# Patient Record
Sex: MALE | Race: WHITE
[De-identification: names, ages, dates, MRNs, and addresses within clinical notes are randomized per-mention and may not be internally consistent; named-entity substitution may affect disease eponyms.]

---

## 2018-10-01 ENCOUNTER — HOSPITAL ENCOUNTER (EMERGENCY)
Dept: HOSPITAL 14 - H.ER | Age: 43
Discharge: HOME | End: 2018-10-01
Payer: MEDICAID

## 2018-10-01 VITALS
SYSTOLIC BLOOD PRESSURE: 126 MMHG | DIASTOLIC BLOOD PRESSURE: 74 MMHG | TEMPERATURE: 98.1 F | RESPIRATION RATE: 15 BRPM | HEART RATE: 79 BPM

## 2018-10-01 VITALS — BODY MASS INDEX: 25.8 KG/M2

## 2018-10-01 VITALS — OXYGEN SATURATION: 99 %

## 2018-10-01 DIAGNOSIS — K52.9: Primary | ICD-10-CM

## 2018-10-01 DIAGNOSIS — D64.9: ICD-10-CM

## 2018-10-01 LAB
ALBUMIN SERPL-MCNC: 3.7 G/DL (ref 3.5–5)
ALBUMIN/GLOB SERPL: 1.2 {RATIO} (ref 1–2.1)
ALT SERPL-CCNC: 25 U/L (ref 21–72)
AST SERPL-CCNC: 16 U/L (ref 17–59)
BASOPHILS # BLD AUTO: 0 K/UL (ref 0–0.2)
BASOPHILS NFR BLD: 0.6 % (ref 0–2)
BILIRUB UR-MCNC: NEGATIVE MG/DL
BUN SERPL-MCNC: 16 MG/DL (ref 9–20)
CALCIUM SERPL-MCNC: 8.8 MG/DL (ref 8.4–10.2)
COLOR UR: YELLOW
EOSINOPHIL # BLD AUTO: 0.3 K/UL (ref 0–0.7)
EOSINOPHIL NFR BLD: 4.2 % (ref 0–4)
ERYTHROCYTE [DISTWIDTH] IN BLOOD BY AUTOMATED COUNT: 17 % (ref 11.5–14.5)
GFR NON-AFRICAN AMERICAN: > 60
GLUCOSE UR STRIP-MCNC: (no result) MG/DL
HGB BLD-MCNC: 8.5 G/DL (ref 12–18)
LEUKOCYTE ESTERASE UR-ACNC: (no result) LEU/UL
LIPASE SERPL-CCNC: 83 U/L (ref 23–300)
LYMPHOCYTES # BLD AUTO: 1.6 K/UL (ref 1–4.3)
LYMPHOCYTES NFR BLD AUTO: 23.6 % (ref 20–40)
MCH RBC QN AUTO: 20.9 PG (ref 27–31)
MCHC RBC AUTO-ENTMCNC: 30.4 G/DL (ref 33–37)
MCV RBC AUTO: 68.9 FL (ref 80–94)
MONOCYTES # BLD: 0.6 K/UL (ref 0–0.8)
MONOCYTES NFR BLD: 8.2 % (ref 0–10)
NEUTROPHILS # BLD: 4.4 K/UL (ref 1.8–7)
NEUTROPHILS NFR BLD AUTO: 63.4 % (ref 50–75)
NRBC BLD AUTO-RTO: 0 % (ref 0–0)
PH UR STRIP: 6 [PH] (ref 5–8)
PLATELET # BLD: 258 K/UL (ref 130–400)
PMV BLD AUTO: 8 FL (ref 7.2–11.7)
PROT UR STRIP-MCNC: NEGATIVE MG/DL
RBC # BLD AUTO: 4.07 MIL/UL (ref 4.4–5.9)
RBC # UR STRIP: NEGATIVE /UL
SP GR UR STRIP: 1.02 (ref 1–1.03)
URINE CLARITY: CLEAR
UROBILINOGEN UR-MCNC: (no result) MG/DL (ref 0.2–1)
WBC # BLD AUTO: 6.9 K/UL (ref 4.8–10.8)

## 2018-10-01 PROCEDURE — 96374 THER/PROPH/DIAG INJ IV PUSH: CPT

## 2018-10-01 PROCEDURE — 81003 URINALYSIS AUTO W/O SCOPE: CPT

## 2018-10-01 PROCEDURE — 99283 EMERGENCY DEPT VISIT LOW MDM: CPT

## 2018-10-01 PROCEDURE — 85025 COMPLETE CBC W/AUTO DIFF WBC: CPT

## 2018-10-01 PROCEDURE — 83992 ASSAY FOR PHENCYCLIDINE: CPT

## 2018-10-01 PROCEDURE — 74022 RADEX COMPL AQT ABD SERIES: CPT

## 2018-10-01 PROCEDURE — 80349 CANNABINOIDS NATURAL: CPT

## 2018-10-01 PROCEDURE — 80346 BENZODIAZEPINES1-12: CPT

## 2018-10-01 PROCEDURE — 74177 CT ABD & PELVIS W/CONTRAST: CPT

## 2018-10-01 PROCEDURE — 80358 DRUG SCREENING METHADONE: CPT

## 2018-10-01 PROCEDURE — 80053 COMPREHEN METABOLIC PANEL: CPT

## 2018-10-01 PROCEDURE — 80345 DRUG SCREENING BARBITURATES: CPT

## 2018-10-01 PROCEDURE — 80324 DRUG SCREEN AMPHETAMINES 1/2: CPT

## 2018-10-01 PROCEDURE — 80353 DRUG SCREENING COCAINE: CPT

## 2018-10-01 PROCEDURE — 96375 TX/PRO/DX INJ NEW DRUG ADDON: CPT

## 2018-10-01 PROCEDURE — 80361 OPIATES 1 OR MORE: CPT

## 2018-10-01 PROCEDURE — 83690 ASSAY OF LIPASE: CPT

## 2018-10-01 NOTE — RAD
Date of service: 



10/01/2018



PROCEDURE:  Radiographs of the chest and abdomen (obstructive series)



HISTORY:

 abd pain 



COMPARISON:

No prior.



TECHNIQUE:

AP radiograph of the chest, with upright and supine radiographs of 

the abdomen.



FINDINGS:



CHEST:

Lungs: Clear.



Cardiovascular: Normal size heart. No pulmonary vascular congestion.



Pleura: No pleural fluid. No pneumothorax.



Other findings: None.



ABDOMEN AND PELVIS:

Bowel: Unremarkable bowel gas pattern. No evidence of mechanical 

obstruction.



Free air: None.



Bones:  Unremarkable.



Other findings: Small calcifications are identified overlying the 

plane of the mid and lower pole left renal silhouette.



IMPRESSION:

Questionable calculi at the left kidney as discussed above.  None are 

seen at the right.  No evidence of mechanical bowel obstruction.  

Nonacute chest radiograph.

## 2018-10-01 NOTE — CT
Date of service: 



10/01/2018



PROCEDURE:  CT Abdomen and Pelvis with contrast



HISTORY:

ABDOMINAL PAIN; ANEMIA



COMPARISON:

None.



TECHNIQUE:

Intravenous contrast dose: 95 cc Omnipaque 300



Radiation dose:



Total exam DLP = 528.82 mGy-cm.



This CT exam was performed using one or more of the following dose 

reduction techniques: Automated exposure control, adjustment of the 

mA and/or kV according to patient size, and/or use of iterative 

reconstruction technique.



FINDINGS:



LOWER THORAX:

Unremarkable. 



LIVER:

Simple cyst right hepatic lobe 2 cm.  Hepatic steatosis. No focal 

masses.  No intrahepatic bile duct dilatation or perihepatic ascites. 



GALLBLADDER AND BILE DUCTS:

Unremarkable. 



PANCREAS:

Unremarkable. No gross lesion or ductal dilatation.



SPLEEN:

Unremarkable. 



ADRENALS:

Unremarkable. No mass. 



KIDNEYS AND URETERS:

Solitary nonobstructing calculus 3 mm midpole left kidney..  No 

hydronephrosis. No solid mass. Incidental finding(s): Sub cm cyst 

midpole left kidney. 



VASCULATURE:

Unremarkable. No aortic aneurysm. 



BOWEL:

Mild inflammatory changes affecting the ascending colon.  

Unremarkable terminal ileum.



Mild dilatation, thickening of the wall of proximal small bowel 

without mechanical obstruction.  



Constipation without fecal impaction or obstruction. 



APPENDIX:

No abnormalities to suggest acute appendicitis. No right lower 

quadrant inflammatory processes identified. 



PERITONEUM:

Unremarkable. No free fluid. No free air. 



LYMPH NODES:

Unremarkable. No enlarged lymph nodes. 



BLADDER:

Unremarkable. 



REPRODUCTIVE:

Unremarkable. 



BONES:

No acute fracture. 



OTHER FINDINGS:

None.



IMPRESSION:

Findings consistent with mild enterocolitis.  Specifically, the wall 

of the cecum, adjacent ascending colon is thickened with pericolonic 

inflammatory changes.  Mild dilatation of proximal small bowel with 

thickening of bowel wall represents the mild enteritis. 



Additional benign and/or incidental findings described above.

## 2018-10-01 NOTE — ED PDOC
HPI: General Adult


Chief Complaint (Provider): abd pain


History Per: Patient (43 y/o male here with ongoing abdominal pain intermittent 

x 2 months associated with abdominal distention and intermittently improved with

simethicone.  States he was seen at [facility] and given rx for acid reducer and 

simethicone.  Was advised f/u for endoscopy but has not done so yet. Denies any 

fevers /chills.  Has h/o appendectomy.  Admits smoking 5-6 cig daily. Notes etoh

1/week. No drug activity.  Notes pain made worse with chinese food.)





<Jocelyne Ballard - Last Filed: 10/01/18 20:33>





<Brandy Bonner - Last Filed: 10/06/18 11:42>


Time Seen by Provider: 10/01/18 10:47


Chief Complaint (Nursing): Abdominal Pain





Past Medical History


Reviewed: Historical Data, Nursing Documentation, Vital Signs


Vital Signs: 





                                Last Vital Signs











Temp  99 F   10/01/18 09:57


 


Pulse  74   10/01/18 09:57


 


Resp  20   10/01/18 09:57


 


BP  123/70   10/01/18 09:57


 


Pulse Ox  99   10/01/18 09:57














- Family History


Family History: States: No Known Family Hx





- Social History


Current smoker - smoking cessation education provided: Yes (5-6 per day)


Alcohol: Occasional


Drugs: Denies





- Immunization History


Hx Tetanus Toxoid Vaccination: No


Hx Influenza Vaccination: No


Hx Pneumococcal Vaccination: No





<Jocelyne Ballard - Last Filed: 10/01/18 20:33>


Vital Signs: 





                                Last Vital Signs











Temp  98.1 F   10/01/18 21:20


 


Pulse  79   10/01/18 21:20


 


Resp  15   10/01/18 21:20


 


BP  126/74   10/01/18 21:20


 


Pulse Ox  99   10/01/18 21:20














<Brandy Bonner - Last Filed: 10/06/18 11:42>





- Home Medications


Home Medications: 


                                Ambulatory Orders











 Medication  Instructions  Recorded


 


Ciprofloxacin HCl [Cipro] 500 mg PO BID #14 tablet 10/01/18


 


Pantoprazole Sodium [Protonix] 40 mg PO DAILY #15 ect 10/01/18














- Allergies


Allergies/Adverse Reactions: 


                                    Allergies











Allergy/AdvReac Type Severity Reaction Status Date / Time


 


No Known Allergies Allergy   Verified 03/09/14 12:33














Review of Systems


ROS Statement: Except As Marked, All Systems Reviewed And Found Negative





<Jocelyne Ballard - Last Filed: 10/01/18 20:33>





Physical Exam





- Reviewed


Nursing Documentation Reviewed: Yes


Vital Signs Reviewed: Yes





- Physical Exam


Appears: Positive for: Well, Non-toxic, No Acute Distress


Head Exam: Positive for: ATRAUMATIC, NORMAL INSPECTION, NORMOCEPHALIC


Skin: Positive for: Normal Color, Warm, DRY


Eye Exam: Positive for: EOMI, Normal appearance, PERRL


ENT: Positive for: Normal ENT Inspection


Neck: Positive for: Normal, Painless ROM


Cardiovascular/Chest: Positive for: Regular Rate, Rhythm


Respiratory: Positive for: CNT, Normal Breath Sounds


Gastrointestinal/Abdominal: Positive for: Normal Exam, Soft, Tenderness (minimal

 llq tenderness)


Back: Positive for: Normal Inspection


Extremity: Positive for: Normal ROM


Neurologic/Psych: Positive for: Alert, Oriented





<Jocelyne Ballard - Last Filed: 10/01/18 20:33>





- Laboratory Results


Result Diagrams: 


                                 10/01/18 12:30





                                 10/01/18 12:30





- ECG


O2 Sat by Pulse Oximetry: 99





- Progress


ED Course And Treament: 





bentyl 20 mg x 1 dose


pepcid 20 mg iv x 1 dose








CT ABD/PELVIS:





None.





IMPRESSION:


Findings consistent with mild enterocolitis.  Specifically, the wall of the 

cecum, adjacent ascending colon is thickened with pericolonic inflammatory 

changes.  Mild dilatation of proximal small bowel with thickening of bowel wall 

represents the mild enteritis. 





Additional benign and/or incidental findings described above.





patient refused rectal exam here.








<BallardJocelyne crowell - Last Filed: 10/01/18 20:33>





- Laboratory Results


Result Diagrams: 


                                 10/01/18 12:30





                                 10/01/18 12:30





<Brandy Bonner - Last Filed: 10/06/18 11:42>





Disposition





- Patient ED Disposition


Is Patient to be Admitted: No





- Disposition


Disposition: Routine/Home


Disposition Time: 20:06





<Jocelyne Ballard - Last Filed: 10/01/18 20:33>





<Brandy Bonner - Last Filed: 10/06/18 11:42>





- Clinical Impression


Clinical Impression: 


 Enterocolitis, Anemia








- Disposition


Referrals: 


Sarath Rowell MD [Medical Doctor] - 


[facility] [place] [Outside]


Condition: IMPROVED


Additional Instructions: 


FOLLOW UP WITH PMD OR GI IN 2-3 DAYS AND REPEAT YOUR CBC 


Prescriptions: 


Ciprofloxacin HCl [Cipro] 500 mg PO BID #14 tablet


Pantoprazole Sodium [Protonix] 40 mg PO DAILY #15 ect


Instructions:  Low Fiber Diet, Acute Abdomen (Belly Pain), Adult (DC)


Forms:  [facility] (English), [facility] ED School/Work Excuse





Addendum


Addendum: 





10/06/18 11:42


Reviewed PA chart.  Agree with assessment and plan.





<Brandy Bonner - Last Filed: 10/06/18 11:42>

## 2018-10-11 ENCOUNTER — HOSPITAL ENCOUNTER (INPATIENT)
Dept: HOSPITAL 14 - H.ER | Age: 43
LOS: 11 days | Discharge: HOME | DRG: 148 | End: 2018-10-22
Attending: INTERNAL MEDICINE | Admitting: INTERNAL MEDICINE
Payer: MEDICAID

## 2018-10-11 VITALS — BODY MASS INDEX: 25.8 KG/M2

## 2018-10-11 DIAGNOSIS — Z53.31: ICD-10-CM

## 2018-10-11 DIAGNOSIS — C18.2: Primary | ICD-10-CM

## 2018-10-11 DIAGNOSIS — D50.0: ICD-10-CM

## 2018-10-11 DIAGNOSIS — G89.18: ICD-10-CM

## 2018-10-11 DIAGNOSIS — F10.10: ICD-10-CM

## 2018-10-11 DIAGNOSIS — F17.210: ICD-10-CM

## 2018-10-11 DIAGNOSIS — K52.9: ICD-10-CM

## 2018-10-11 DIAGNOSIS — K56.690: ICD-10-CM

## 2018-10-11 LAB
ALBUMIN SERPL-MCNC: 4 G/DL (ref 3.5–5)
ALBUMIN/GLOB SERPL: 1.2 {RATIO} (ref 1–2.1)
ALT SERPL-CCNC: 27 U/L (ref 21–72)
APTT BLD: 29.7 SECONDS (ref 25.6–37.1)
AST SERPL-CCNC: 24 U/L (ref 17–59)
BASOPHILS # BLD AUTO: 0 K/UL (ref 0–0.2)
BASOPHILS NFR BLD: 0.3 % (ref 0–2)
BUN SERPL-MCNC: 11 MG/DL (ref 9–20)
CALCIUM SERPL-MCNC: 9.1 MG/DL (ref 8.4–10.2)
EOSINOPHIL # BLD AUTO: 0.2 K/UL (ref 0–0.7)
EOSINOPHIL NFR BLD: 2 % (ref 0–4)
ERYTHROCYTE [DISTWIDTH] IN BLOOD BY AUTOMATED COUNT: 17.3 % (ref 11.5–14.5)
GFR NON-AFRICAN AMERICAN: > 60
HGB BLD-MCNC: 8.8 G/DL (ref 12–18)
INR PPP: 1.1
LYMPHOCYTES # BLD AUTO: 1 K/UL (ref 1–4.3)
LYMPHOCYTES NFR BLD AUTO: 11.8 % (ref 20–40)
MCH RBC QN AUTO: 21.1 PG (ref 27–31)
MCHC RBC AUTO-ENTMCNC: 31.1 G/DL (ref 33–37)
MCV RBC AUTO: 67.8 FL (ref 80–94)
MONOCYTES # BLD: 0.6 K/UL (ref 0–0.8)
MONOCYTES NFR BLD: 6.5 % (ref 0–10)
NEUTROPHILS # BLD: 6.9 K/UL (ref 1.8–7)
NEUTROPHILS NFR BLD AUTO: 79.4 % (ref 50–75)
NRBC BLD AUTO-RTO: 0.1 % (ref 0–0)
PLATELET # BLD: 303 K/UL (ref 130–400)
PMV BLD AUTO: 7.8 FL (ref 7.2–11.7)
PROTHROMBIN TIME: 11.9 SECONDS (ref 9.8–13.1)
RBC # BLD AUTO: 4.15 MIL/UL (ref 4.4–5.9)
WBC # BLD AUTO: 8.7 K/UL (ref 4.8–10.8)

## 2018-10-11 NOTE — ED PDOC
HPI: Abdomen


Time Seen by Provider: 10/11/18 18:03


Chief Complaint (Nursing): Abdominal Pain


Chief Complaint (Provider): Abdominal pain


History Per: Patient


History/Exam Limitations: no limitations


Onset/Duration Of Symptoms: Days, Persistent


Current Symptoms Are (Timing): Still Present


Location Of Pain/Discomfort: RUQ, RLQ


Quality Of Discomfort: "Pain"


Associated Symptoms: Loss Of Appetite


Additional History Per: Patient


Additional Complaint(s): 


41yo male, returns to ER with persistent and worsening abdominal pain, 

localizing to his right side. Patient reports associated loss of appetite and 

states his symptoms are unrelieved after taking cipro (prescribed last ER 

visit.) Prior records reviewed, and patient had hemoglobin of 8.4; patient 

states he was unaware of prior anemia and does admit to dyspnea on exertion. 

Patient also reports red colored stools; he reports he was due for a colonoscopy

but has not done it as he was unable to secure chairty care. Patient otherwise 

denies chest pain and offers no additional medical complaints.





PMD: None provided





Past Medical History


Reviewed: Historical Data, Nursing Documentation, Vital Signs


Vital Signs: 





                                Last Vital Signs











Temp  98.7 F   10/11/18 17:48


 


Pulse  68   10/11/18 17:48


 


Resp  16   10/11/18 17:48


 


BP  136/74   10/11/18 17:48


 


Pulse Ox  100   10/11/18 17:48














- Medical History


PMH: No Chronic Diseases





- Surgical History


Surgical History: Appendectomy





- Family History


Family History: States: No Known Family Hx





- Social History


Current smoker - smoking cessation education provided: Yes


Alcohol: Occasional


Drugs: Denies





- Immunization History


Hx Tetanus Toxoid Vaccination: No


Hx Influenza Vaccination: No


Hx Pneumococcal Vaccination: No





- Allergies


Allergies/Adverse Reactions: 


                                    Allergies











Allergy/AdvReac Type Severity Reaction Status Date / Time


 


No Known Allergies Allergy   Verified 10/11/18 17:48














Review of Systems


ROS Statement: Except As Marked, All Systems Reviewed And Found Negative


Constitutional: Negative for: Fever, Chills


Cardiovascular: Negative for: Chest Pain


Respiratory: Positive for: SOB with Exertion


Gastrointestinal: Positive for: Abdominal Pain, Other (loss of appetite; 

abnormal stools)





Physical Exam





- Reviewed


Nursing Documentation Reviewed: Yes


Vital Signs Reviewed: Yes





- Physical Exam


Appears: Positive for: Non-toxic


Head Exam: Positive for: ATRAUMATIC, NORMAL INSPECTION, NORMOCEPHALIC


Skin: Positive for: Pallor


Eye Exam: Positive for: Normal appearance


Neck: Positive for: Normal, Supple


Cardiovascular/Chest: Positive for: Regular Rate, Rhythm


Respiratory: Positive for: Normal Breath Sounds


Gastrointestinal/Abdominal: Positive for: Soft, Tenderness (tenderness to 

palpation right upper and lower quadrants).  Negative for: Guarding, Rebound


Back: Positive for: Normal Inspection.  Negative for: L CVA Tenderness, R CVA 

Tenderness, Vertebral Tenderness


Extremity: Positive for: Normal ROM.  Negative for: Pedal Edema


Neurologic/Psych: Positive for: Alert, Oriented.  Negative for: Motor/Sensory 

Deficits





- Laboratory Results


Result Diagrams: 


                                 10/16/18 05:40





                                 10/16/18 13:40





- ECG


O2 Sat by Pulse Oximetry: 100 (RA)


Pulse Ox Interpretation: Normal





Medical Decision Making


Medical Decision Making: 


Prior charts reviewed; labs to be repeated to r/o worsening anemia.


Re-image abdomen, given tenderness on exam.





Hgb noted to be 8.8





20:00


Patient signed out to Dr. An pending CT study, reassessment.





Scribe Attestation:


Documented by Shi Clay, acting as a scribe for Berny Eason DO.





Provider Scribe Attestation:


All medical record entries made by the Scribe were at my direction and 

personally dictated by me. I have reviewed the chart and agree that the record 

accurately reflects my personal performance of the history, physical exam, 

medical decision making, and the department course for this patient. I have also

personally directed, reviewed, and agree with the discharge instructions and 

disposition.








Disposition





- Clinical Impression


Clinical Impression: 


 Colitis








- Patient ED Disposition


Is Patient to be Admitted: Transfer of Care


Counseled Patient/Family Regarding: Studies Performed, Diagnosis





- Disposition


Disposition: Transfer of Care


Disposition Time: 19:54


Condition: STABLE


Patient Signed Over To: Kahlil An

## 2018-10-11 NOTE — ED PDOC
- Laboratory Results


Result Diagrams: 


                                 10/11/18 18:30





                                 10/11/18 18:30





- ECG


O2 Sat by Pulse Oximetry: 100 (RA)


Pulse Ox Interpretation: Normal





Medical Decision Making


Medical Decision Makin


Patient signed out to me by Dr. Patel pending CT Abdomen, reassessment.








Patient reports persistent pain, Morphine 4mg IV given.





22:28


Abdomen/Pelvis CT


Findings: There is dilatation and pericolonic inflammatory changes in the 

descending colon and proximal transverse colon.  There is a submucosal fat 

stripe appreciated of the descending colon.there is an 8 mm cystic-like 

structure at the periphery of the liver.  Further evaluated by ultrasound.  The 

remainder of the solid abdominal organs are unremarkable.  The gallbladder is 

benign.there is prominence of the gastric folds.  There is no retroperitoneal 

adenopathy.  No free pelvic fluid. 


The heart is not enlarged.  There are no infiltrates or pulmonary base. 





Impression: Findings consistent with a colitis.  Correlate clinically.





23:20


-Discussed case with Dr. Jose, medicine on call. Labs showed no clinical 

significant abnormalities. Patient continues to have abdominal pain given recent

Cipro and CT that showed mild enterocolitis 10 days ago, will admit for failure 

of outpatient treatment diagnosis. Diagnosis of enterocolitis.





Scribe Attestation:


Documented by Shi Clay, acting as a scribe for Kahlil An MD.





Provider Scribe Attestation:


All medical record entries made by the Scribe were at my direction and 

personally dictated by me. I have reviewed the chart and agree that the record 

accurately reflects my personal performance of the history, physical exam, 

medical decision making, and the department course for this patient. I have also

personally directed, reviewed, and agree with the discharge instructions and 

disposition.








Disposition


Discussed With : Brady Jose





- Clinical Impression


Clinical Impression: 


 Colitis








- POA


Present On Arrival: None





- Disposition


Disposition: Admitted as In-Patient


Disposition Time: 23:20


Condition: STABLE

## 2018-10-12 LAB
ALBUMIN SERPL-MCNC: 3.7 G/DL (ref 3.5–5)
ALBUMIN/GLOB SERPL: 1.1 {RATIO} (ref 1–2.1)
ALT SERPL-CCNC: 26 U/L (ref 21–72)
AST SERPL-CCNC: 22 U/L (ref 17–59)
BASOPHILS # BLD AUTO: 0 K/UL (ref 0–0.2)
BASOPHILS NFR BLD: 0.3 % (ref 0–2)
BILIRUB UR-MCNC: NEGATIVE MG/DL
BUN SERPL-MCNC: 9 MG/DL (ref 9–20)
CALCIUM SERPL-MCNC: 8.8 MG/DL (ref 8.4–10.2)
COLOR UR: COLORLESS
EOSINOPHIL # BLD AUTO: 0.2 K/UL (ref 0–0.7)
EOSINOPHIL NFR BLD: 3.1 % (ref 0–4)
ERYTHROCYTE [DISTWIDTH] IN BLOOD BY AUTOMATED COUNT: 17.1 % (ref 11.5–14.5)
GFR NON-AFRICAN AMERICAN: > 60
GLUCOSE UR STRIP-MCNC: (no result) MG/DL
HDLC SERPL-MCNC: 42 MG/DL (ref 30–70)
HGB BLD-MCNC: 9.1 G/DL (ref 12–18)
LDLC SERPL-MCNC: 81 MG/DL (ref 0–129)
LEUKOCYTE ESTERASE UR-ACNC: (no result) LEU/UL
LYMPHOCYTES # BLD AUTO: 0.9 K/UL (ref 1–4.3)
LYMPHOCYTES NFR BLD AUTO: 10.8 % (ref 20–40)
MCH RBC QN AUTO: 21.3 PG (ref 27–31)
MCHC RBC AUTO-ENTMCNC: 31.5 G/DL (ref 33–37)
MCV RBC AUTO: 67.4 FL (ref 80–94)
MONOCYTES # BLD: 0.6 K/UL (ref 0–0.8)
MONOCYTES NFR BLD: 8 % (ref 0–10)
NEUTROPHILS # BLD: 6.1 K/UL (ref 1.8–7)
NEUTROPHILS NFR BLD AUTO: 77.8 % (ref 50–75)
NRBC BLD AUTO-RTO: 0.1 % (ref 0–0)
PH UR STRIP: 6 [PH] (ref 5–8)
PLATELET # BLD: 270 K/UL (ref 130–400)
PMV BLD AUTO: 7.7 FL (ref 7.2–11.7)
PROT UR STRIP-MCNC: NEGATIVE MG/DL
RBC # BLD AUTO: 4.28 MIL/UL (ref 4.4–5.9)
RBC # UR STRIP: NEGATIVE /UL
SP GR UR STRIP: 1 (ref 1–1.03)
T4 SERPL-MCNC: 8.02 UG/DL (ref 5.5–11)
URINE CLARITY: CLEAR
UROBILINOGEN UR-MCNC: (no result) MG/DL (ref 0.2–1)
WBC # BLD AUTO: 7.9 K/UL (ref 4.8–10.8)

## 2018-10-12 PROCEDURE — 0DDK8ZX EXTRACTION OF ASCENDING COLON, VIA NATURAL OR ARTIFICIAL OPENING ENDOSCOPIC, DIAGNOSTIC: ICD-10-PCS | Performed by: INTERNAL MEDICINE

## 2018-10-12 PROCEDURE — 0DDN8ZX EXTRACTION OF SIGMOID COLON, VIA NATURAL OR ARTIFICIAL OPENING ENDOSCOPIC, DIAGNOSTIC: ICD-10-PCS | Performed by: INTERNAL MEDICINE

## 2018-10-12 RX ADMIN — DEXTROSE AND SODIUM CHLORIDE SCH MLS/HR: 5; 450 INJECTION, SOLUTION INTRAVENOUS at 17:55

## 2018-10-12 NOTE — CP.PCM.CON
History of Present Illness





- History of Present Illness


History of Present Illness: 





GI consult note for Dr. Ara Balderas, PGY-2





Pt S & E at bedside at 0005





42F w/no sig PMH consulted for colitis.  Pt reports that he was seen on 10/1 for

abdominal pain, was discharged on Cipro/flagyl, however did not have money to 

pay for flagyl, so only took Cipro, decided to cut Cipro in 1/2 for last few 

doses due to lack of terrence resources.  Pt reports recurrence of suprapubic 

abdominal pain 2 days prior to evaluation.  Pain radiated diffusely, constant, 

moderate-severe.  Pain re-located to LLQ over time, increased with deep 

inspiration.  Admits to change in BM caliber over last few weeks- now small 

"marble" like, has to strain when defecating, decreased BM frequency, reports 

color now more maroon.  Reports bright red blood per rectum a few weeks ago with

blood noted on toilet paper.  Denies melena, diarrhea, F & C, changes in urinary

habits, changes in eating habits, CP, SOB, headache, recent travel, other people

in household with similar findings. 








In ED- Afebrile, no leukocytosis.  Hgb 8.8 - was 8.5 on 10/1. Albumin 4.0.  CT 

ab w/findings consistent with colitis - 


dilatation and pericolonic inflammatory changes in the descending colon and 

proximal transverse colon.  There is a submucosal fat stripe appreciated of the 

descending colon.there is an 8 mm cystic-like structure at the periphery of the 

liver.





PMH: Denies


PSH: open appendectomy


All: NKDA


SH: Admits to ETOH use- 1/2 pint [place] w/beer every 2 weeks, admits to 

tobacco use- #5-6 daily x few months, admits to recent cocaine use due to 

divorce, job stress etc


FH: Denies hx of colon cancer or diseases in family


PMD: Denies





Review of Systems





- Review of Systems


All systems: reviewed and no additional remarkable complaints except





- Constitutional


Constitutional: absent: Chills, Fever, Headache





- EENT


Eyes: Spots in Vision.  absent: Change in Vision


Nose/Mouth/Throat: absent: Sore Throat





- Cardiovascular


Cardiovascular: absent: Chest Pain





- Respiratory


Respiratory: Pain on Inspiration





- Gastrointestinal


Gastrointestinal: Abdominal Pain, Change in Bowel Habits, Change in Stool 

Character, Constipation.  absent: Diarrhea, Hematochezia, Melena, Nausea, 

Temesmus, Vomiting





- Genitourinary


Genitourinary: absent: Dysuria, Hematuria





- Musculoskeletal


Musculoskeletal: absent: Back Pain





- Integumentary


Integumentary: absent: Rash





- Neurological


Neurological: absent: Weakness





- Psychiatric


Psychiatric: absent: Change in Appetite





Past Patient History





- Past Social History


Alcohol: Occasional


Drugs: Denies





- PSYCHIATRIC


Hx Substance Use: Yes (ectasy)





- SURGICAL HISTORY


Hx Appendectomy: Yes





Meds


Allergies/Adverse Reactions: 


                                    Allergies











Allergy/AdvReac Type Severity Reaction Status Date / Time


 


No Known Allergies Allergy   Verified 10/11/18 17:48














Physical Exam





- Constitutional


Appears: Non-toxic, No Acute Distress





- Head Exam


Head Exam: ATRAUMATIC, NORMAL INSPECTION, NORMOCEPHALIC





- Eye Exam


Eye Exam: EOMI, Normal appearance





- ENT Exam


ENT Exam: Mucous Membranes Moist, Normal Exam





- Neck Exam


Neck exam: Positive for: Full Rom, Normal Inspection





- Respiratory Exam


Respiratory Exam: Clear to Auscultation Bilateral, NORMAL BREATHING PATTERN.  

absent: Prolonged Expiratory Phase, Rales, Rhonchi, Wheezes, Respiratory 

Distress





- Cardiovascular Exam


Cardiovascular Exam: REGULAR RHYTHM, +S1, +S2





- GI/Abdominal Exam


GI & Abdominal Exam: Guarding (LLQ), Normal Bowel Sounds, Soft, Tenderness 

(LLQ).  absent: Distended, Firm, Hernia, Rebound, Rigid





- Rectal Exam


Rectal Exam: Deferred





- Extremities Exam


Extremities exam: Positive for: normal inspection





- Back Exam


Back exam: NORMAL INSPECTION.  absent: CVA tenderness (L), CVA tenderness (R)





- Neurological Exam


Neurological exam: Alert, CN II-XII Intact, Oriented x3





- Psychiatric Exam


Psychiatric exam: Normal Affect, Normal Mood





- Skin


Skin Exam: Dry, Intact, Normal Color, Warm





Results





- Vital Signs


Recent Vital Signs: 


                                Last Vital Signs











Temp  98.7 F   10/11/18 17:48


 


Pulse  68   10/11/18 17:48


 


Resp  16   10/11/18 17:48


 


BP  136/74   10/11/18 17:48


 


Pulse Ox  100   10/11/18 23:30














- Labs


Result Diagrams: 


                                 10/11/18 18:30





                                 10/11/18 18:30


Labs: 


                         Laboratory Results - last 24 hr











  10/11/18 10/11/18 10/11/18





  18:30 18:30 18:30


 


WBC   8.7 


 


RBC   4.15 L 


 


Hgb   8.8 L 


 


Hct   28.1 L 


 


MCV   67.8 L 


 


MCH   21.1 L 


 


MCHC   31.1 L 


 


RDW   17.3 H 


 


Plt Count   303 


 


MPV   7.8 


 


Neut % (Auto)   79.4 H 


 


Lymph % (Auto)   11.8 L 


 


Mono % (Auto)   6.5 


 


Eos % (Auto)   2.0 


 


Baso % (Auto)   0.3 


 


Neut # (Auto)   6.9 


 


Lymph # (Auto)   1.0 


 


Mono # (Auto)   0.6 


 


Eos # (Auto)   0.2 


 


Baso # (Auto)   0.0 


 


PT    11.9


 


INR    1.1


 


APTT    29.7


 


Sodium  140  


 


Potassium  3.8  


 


Chloride  109 H  


 


Carbon Dioxide  23  


 


Anion Gap  12  


 


BUN  11  


 


Creatinine  0.8  


 


Est GFR ( Amer)  > 60  


 


Est GFR (Non-Af Amer)  > 60  


 


Random Glucose  108  


 


Calcium  9.1  


 


Magnesium  2.0  


 


Total Bilirubin  0.1 L  


 


AST  24  


 


ALT  27  


 


Alkaline Phosphatase  95  


 


Total Protein  7.2  


 


Albumin  4.0  


 


Globulin  3.2  


 


Albumin/Globulin Ratio  1.2  














Assessment & Plan





- Assessment and Plan (Free Text)


Assessment: 





42M w/colitis - failed outpatient treatment


Plan: 





FU AM labs


NPO


IVF


Abx 


Enemas until BM clear


Plan for colonoscopy today





DW Dr. Mateusz Balderas, PGY-2








- Date & Time


Date: 10/12/18


Time: 00:31

## 2018-10-12 NOTE — CT
Date of service: 



10/12/2018



PROCEDURE:  CT Chest without contrast



HISTORY:

Admission, colon mass



COMPARISON:

None available.



TECHNIQUE:

Contiguous axial images were obtained through the chest without 

intravenous contrast enhancement. Sagittal and coronal 

reconstructions were performed.







Radiation dose (DLP): 396.25 mGy-cm. 



This CT exam was performed using one or more of the following dose 

reduction techniques: Automated exposure control, adjustment of the 

mA and/or kV according to patient size, and/or use of iterative 

reconstruction technique.



FINDINGS:



LUNGS:

 Clear lungs. Visualized airway clear



MEDIASTINUM:

Unremarkable thoracic aorta. No aneurysm. Normal sized heart. Main 

pulmonary artery unremarkable. No vascular congestion. No 

lymphadenopathy.



PLEURA:

No pleural fluid. No pneumothorax.



BONES:

No fracture. No destructive lesion. 



UPPER ABDOMEN:

Grossly unremarkable.



OTHER FINDINGS:

None.



IMPRESSION:

No evidence of thoracic metastasis.  Unremarkable examination.

## 2018-10-12 NOTE — CT
Date of service: 



10/11/2018



PROCEDURE:  CT Abdomen and Pelvis with contrast



HISTORY:

persistent abd pain, pallor, repeat CT



COMPARISON:

10/01/2018 CT abdomen and pelvis.



TECHNIQUE:

Intravenous contrast dose: 100 cc Omnipaque 300



Radiation dose:



Total exam DLP = 531.72 mGy-cm.



This CT exam was performed using one or more of the following dose 

reduction techniques: Automated exposure control, adjustment of the 

mA and/or kV according to patient size, and/or use of iterative 

reconstruction technique.



FINDINGS:



LOWER THORAX:

Unremarkable. 



LIVER:

Unremarkable. No gross lesion or ductal dilatation. Simple cyst right 

hepatic lobe 



GALLBLADDER AND BILE DUCTS:

Unremarkable. 



PANCREAS:

Unremarkable. No gross lesion or ductal dilatation.



SPLEEN:

Unremarkable. 



ADRENALS:

Unremarkable. No mass. 



KIDNEYS AND URETERS:

Unremarkable. No hydronephrosis. No solid mass. 



VASCULATURE:

Unremarkable. No aortic aneurysm. 



BOWEL:

Inflammatory changes affecting the ascending colon have improved 

since the prior study.



This has identified an area of asymmetric circumferential narrowing 

in the hepatic flexure. Proximal to this the ascending colon is 

distended .  The findings are suspicious for neoplasm of the colon.



The remainder of the colon is nondistended.



APPENDIX:

Normal appendix. 



PERITONEUM:

Unremarkable. No free fluid. No free air. 



LYMPH NODES:

Unremarkable. No enlarged lymph nodes. 



BLADDER:

Unremarkable. 



REPRODUCTIVE:

Unremarkable. 



BONES:

No acute fracture. 



OTHER FINDINGS:

None.



IMPRESSION:

Suspicious mass in the ascending colon. The prior study documented 

inflammatory changes. Which in the interval have improved identifying 

a circumferential mass suspicious for neoplasm. Proximal distention 

of the colon identified. 



Communication of results: The study was completed at 21:40. 



Preliminary report provided at 22:28. 



Final interpretation 11:31. 



Communication of these findings to nurse involved in the care and 

management this patient (June) at 10:24.

## 2018-10-12 NOTE — CP.PCM.HP
History of Present Illness





- History of Present Illness


History of Present Illness: 





CC: Abdominal pain.





41 y/o M, Hx of substance abuse, recently Dx with Colitis, came in to ER [facility], 

[place] to be evaluated for generalized abdominal pain, onset about 3 months ago

with on and off pain,  gradually increased from 2 days PTA with no relief.  Pt 

appeared in the the ED c/o of abdominal pain more prominent to lower quadrants, 

described as sharp, severe intensity 8:10,  associated to nausea, BM with dark 

stool and bright red blood color.





As per Pt; He was seen in ER [facility] on 10/01/18,  Dx with Colitis on CT, was 

discharged on Cipro and Flagyl, but claims he didn't have enough money to by 

both medications, so he bought only the Cipro, which he used 1/2 dose to prolong

the abx.





Worsening symptoms:  Found with anemia (Hgb 8.8),  also CT Abd/Pelv, showing 

suspicious mass in the ascending colon.  Lack of appetite, loosing about 20 lbs 

in the past 3 months.





Aggravated factor:  Failed outpatient treatment.





Pt denied:  Fever, chills, vomiting, diarrhea, urinary symptoms, CP, 

palpitations, syncope, SOB, cough, sick contact, recent travel out of [place].





As per clinical finding and CT, Pt was seen by GI and underwent Colonoscopy with

Bx. in AM today, showing: Partially obstructive mass in the ascending colon from

anus with some bleeding.











Present on Admission





- Present on Admission


Any Indicators Present on Admission: No





Review of Systems





- Constitutional


Constitutional: Weight Loss, Other (decreased appetite.)





- EENT


Eyes: Other


Ears: Other (negative)


Nose/Mouth/Throat: Other (negative)





- Cardiovascular


Cardiovascular: Other (negative)





- Respiratory


Respiratory: Other (negative)





- Gastrointestinal


Gastrointestinal: Abdominal Pain, Change in Stool Character, Hematochezia, 

Nausea





- Genitourinary


Genitourinary: Other (negative)





- Musculoskeletal


Musculoskeletal: Other (negative)





- Integumentary


Integumentary: Other (negative)





- Neurological


Neurological: Other (negative)





- Psychiatric


Psychiatric: Other (negative)





- Endocrine


Endocrine: Other (negative)





- Hematologic/Lymphatic


Hematologic: Other (anemia)





Past Patient History





- Past Medical History & Family History


Pertinent Family History: 





No family Hx of Ca.





- Past Social History


Smoking Status: Light Smoker < 10 Cigarettes Daily


Alcohol: Occasional


Drugs: Denies


Home Situation {Lives}: Alone





- CARDIAC


Hx Cardiac Disorders: No





- PULMONARY


Hx Respiratory Disorders: No





- NEUROLOGICAL


Hx Neurological Disorder: No





- HEENT


Hx HEENT Problems: No





- RENAL


Hx Chronic Kidney Disease: No





- ENDOCRINE/METABOLIC


Hx Endocrine Disorders: No





- HEMATOLOGICAL/ONCOLOGICAL


Hx Blood Disorders: No





- INTEGUMENTARY


Hx Dermatological Problems: No





- MUSCULOSKELETAL/RHEUMATOLOGICAL


Hx Musculoskeletal Disorders: No


Hx Falls: No





- GASTROINTESTINAL


Hx Gastrointestinal Disorders: Yes


Hx Colitis: Yes





- GENITOURINARY/GYNECOLOGICAL


Hx Genitourinary Disorders: No





- PSYCHIATRIC


Hx Psychophysiologic Disorder: Yes


Hx Depression: Yes


Hx Substance Use: Yes (ectasy)





- SURGICAL HISTORY


Hx Surgeries: Yes


Hx Appendectomy: Yes





- ANESTHESIA


Hx Anesthesia: Yes


Hx Anesthesia Reactions: No


Hx Malignant Hyperthermia: No





Meds


Allergies/Adverse Reactions: 


                                    Allergies











Allergy/AdvReac Type Severity Reaction Status Date / Time


 


No Known Allergies Allergy   Verified 10/11/18 17:48














Physical Exam





- Constitutional


Appears: No Acute Distress





- Head Exam


Head Exam: NORMAL INSPECTION





- Eye Exam


Eye Exam: PERRL





- ENT Exam


ENT Exam: Normal Exam





- Neck Exam


Neck exam: Positive for: Normal Inspection





- Respiratory Exam


Respiratory Exam: NORMAL BREATHING PATTERN





- Cardiovascular Exam


Cardiovascular Exam: REGULAR RHYTHM





- GI/Abdominal Exam


GI & Abdominal Exam: Soft, Tenderness (Rupper and lower quadrant)





- Extremities Exam


Extremities exam: Positive for: normal inspection





- Back Exam


Back exam: NORMAL INSPECTION





- Neurological Exam


Neurological exam: Alert, Oriented x3





- Psychiatric Exam


Psychiatric exam: Normal Mood





- Skin


Skin Exam: Warm





Results





- Vital Signs


Recent Vital Signs: 





                                Last Vital Signs











Temp  98 F   10/12/18 16:18


 


Pulse  62   10/12/18 16:18


 


Resp  18   10/12/18 16:18


 


BP  114/70   10/12/18 16:18


 


Pulse Ox  100   10/12/18 16:18








reviewed J.PDesiree





- Labs


Result Diagrams: 


                                 10/12/18 11:33





                                 10/12/18 11:33


Labs: 





                         Laboratory Results - last 24 hr











  10/11/18 10/11/18 10/11/18





  18:30 18:30 18:30


 


WBC   8.7 


 


RBC   4.15 L 


 


Hgb   8.8 L 


 


Hct   28.1 L 


 


MCV   67.8 L 


 


MCH   21.1 L 


 


MCHC   31.1 L 


 


RDW   17.3 H 


 


Plt Count   303 


 


MPV   7.8 


 


Neut % (Auto)   79.4 H 


 


Lymph % (Auto)   11.8 L 


 


Mono % (Auto)   6.5 


 


Eos % (Auto)   2.0 


 


Baso % (Auto)   0.3 


 


Neut # (Auto)   6.9 


 


Lymph # (Auto)   1.0 


 


Mono # (Auto)   0.6 


 


Eos # (Auto)   0.2 


 


Baso # (Auto)   0.0 


 


PT    11.9


 


INR    1.1


 


APTT    29.7


 


Sodium  140  


 


Potassium  3.8  


 


Chloride  109 H  


 


Carbon Dioxide  23  


 


Anion Gap  12  


 


BUN  11  


 


Creatinine  0.8  


 


Est GFR ( Amer)  > 60  


 


Est GFR (Non-Af Amer)  > 60  


 


Random Glucose  108  


 


Lactic Acid   


 


Calcium  9.1  


 


Magnesium  2.0  


 


Total Bilirubin  0.1 L  


 


AST  24  


 


ALT  27  


 


Alkaline Phosphatase  95  


 


Total Protein  7.2  


 


Albumin  4.0  


 


Globulin  3.2  


 


Albumin/Globulin Ratio  1.2  


 


Triglycerides   


 


Cholesterol   


 


LDL Cholesterol Direct   


 


HDL Cholesterol   


 


Carcinoembryonic Ag   


 


Thyroxine (T4)   


 


TSH 3rd Generation   


 


Urine Color   


 


Urine Clarity   


 


Urine pH   


 


Ur Specific Gravity   


 


Urine Protein   


 


Urine Glucose (UA)   


 


Urine Ketones   


 


Urine Blood   


 


Urine Nitrate   


 


Urine Bilirubin   


 


Urine Urobilinogen   


 


Ur Leukocyte Esterase   


 


Urine RBC (Auto)   


 


HIV-1 Ab Rapid Screen   














  10/12/18 10/12/18 10/12/18





  05:15 05:27 11:33


 


WBC    7.9


 


RBC    4.28 L


 


Hgb    9.1 L


 


Hct    28.8 L


 


MCV    67.4 L


 


MCH    21.3 L


 


MCHC    31.5 L


 


RDW    17.1 H


 


Plt Count    270


 


MPV    7.7


 


Neut % (Auto)    77.8 H


 


Lymph % (Auto)    10.8 L


 


Mono % (Auto)    8.0


 


Eos % (Auto)    3.1


 


Baso % (Auto)    0.3


 


Neut # (Auto)    6.1


 


Lymph # (Auto)    0.9 L


 


Mono # (Auto)    0.6


 


Eos # (Auto)    0.2


 


Baso # (Auto)    0.0


 


PT   


 


INR   


 


APTT   


 


Sodium   


 


Potassium   


 


Chloride   


 


Carbon Dioxide   


 


Anion Gap   


 


BUN   


 


Creatinine   


 


Est GFR ( Amer)   


 


Est GFR (Non-Af Amer)   


 


Random Glucose   


 


Lactic Acid  1.2  


 


Calcium   


 


Magnesium   


 


Total Bilirubin   


 


AST   


 


ALT   


 


Alkaline Phosphatase   


 


Total Protein   


 


Albumin   


 


Globulin   


 


Albumin/Globulin Ratio   


 


Triglycerides   


 


Cholesterol   


 


LDL Cholesterol Direct   


 


HDL Cholesterol   


 


Carcinoembryonic Ag   


 


Thyroxine (T4)   


 


TSH 3rd Generation   


 


Urine Color   Colorless 


 


Urine Clarity   Clear 


 


Urine pH   6.0 


 


Ur Specific Gravity   1.005 


 


Urine Protein   Negative 


 


Urine Glucose (UA)   Neg 


 


Urine Ketones   Negative 


 


Urine Blood   Negative 


 


Urine Nitrate   Negative 


 


Urine Bilirubin   Negative 


 


Urine Urobilinogen   0.2-1.0 


 


Ur Leukocyte Esterase   Neg 


 


Urine RBC (Auto)   1 


 


HIV-1 Ab Rapid Screen   














  10/12/18 10/12/18 10/12/18





  11:33 11:33 11:33


 


WBC   


 


RBC   


 


Hgb   


 


Hct   


 


MCV   


 


MCH   


 


MCHC   


 


RDW   


 


Plt Count   


 


MPV   


 


Neut % (Auto)   


 


Lymph % (Auto)   


 


Mono % (Auto)   


 


Eos % (Auto)   


 


Baso % (Auto)   


 


Neut # (Auto)   


 


Lymph # (Auto)   


 


Mono # (Auto)   


 


Eos # (Auto)   


 


Baso # (Auto)   


 


PT   


 


INR   


 


APTT   


 


Sodium  138  


 


Potassium  4.5  


 


Chloride  104  


 


Carbon Dioxide  28  


 


Anion Gap  11  


 


BUN  9  


 


Creatinine  0.8  


 


Est GFR ( Amer)  > 60  


 


Est GFR (Non-Af Amer)  > 60  


 


Random Glucose  111 H  


 


Lactic Acid   


 


Calcium  8.8  


 


Magnesium   


 


Total Bilirubin  0.2  


 


AST  22  


 


ALT  26  


 


Alkaline Phosphatase  84  


 


Total Protein  7.0  


 


Albumin  3.7  


 


Globulin  3.3  


 


Albumin/Globulin Ratio  1.1  


 


Triglycerides  156 H  


 


Cholesterol  154  


 


LDL Cholesterol Direct  81  


 


HDL Cholesterol  42  


 


Carcinoembryonic Ag    10.4 H


 


Thyroxine (T4)  8.02  


 


TSH 3rd Generation  2.54  


 


Urine Color   


 


Urine Clarity   


 


Urine pH   


 


Ur Specific Gravity   


 


Urine Protein   


 


Urine Glucose (UA)   


 


Urine Ketones   


 


Urine Blood   


 


Urine Nitrate   


 


Urine Bilirubin   


 


Urine Urobilinogen   


 


Ur Leukocyte Esterase   


 


Urine RBC (Auto)   


 


HIV-1 Ab Rapid Screen   Non reactive 








reviewed J.P.





- Imaging and Cardiology


  ** CT scan - abdomen


Status: Report reviewed by me (J.P.)





  ** CT scan - pelvis


Status: Report reviewed by me (J.P.)





  ** CT scan - chest


Status: Report reviewed by me (J.P.)





  ** US - abdomen


Status: Report reviewed by me (J.P.)





Assessment & Plan


(1) Mass of colon


Status: Acute   Priority: High   





(2) Abdominal pain


Status: Acute   Priority: High   





- Assessment and Plan (Free Text)


Plan: 





F/U Pathology report, continue liquid diet, Morphine.  GI, Hematology and 

Surgery consultants appreciated.





- Date & Time


Date: 10/12/18


Time: 14:00

## 2018-10-12 NOTE — RAD
Date of service: 



10/12/2018



HISTORY:

 Localization of clip/ [place] view. 



COMPARISON:

No prior.



FINDINGS:



BOWEL:

There is a small amount of oral contrast seen in the cecum and 

terminal ileum.  Bowel gas pattern is unremarkable.  There is no 

hepatic or splenic enlargement appreciated.  There are no masses or 

abnormal intra-abdominal calcifications.  There is a metallic 

radiopaque foreign body projecting over the right upper quadrant of 

the abdomen.  



BONES:

Normal.



OTHER FINDINGS:

None.



IMPRESSION:

No active disease.

## 2018-10-12 NOTE — CP.PCM.CON
<Katherine eZe - Last Filed: 10/12/18 11:29>





History of Present Illness





- History of Present Illness


History of Present Illness: 





Surgery Consult: Dr. Alatorre





Pt is a 42M with no significant PMHx who presented to [facility] with abdominal pain. 

Pt states he has had on & off abdominal pain for the past 3 months that he 

initially ignored & thought would go away. However, 2 days ago pt started having

intense lower quadrant pain with some associated nausea so he came to the ER. Pt

also reports coming to the ER two weeks ago, at which point he was told he had 

colitis and was sent home with PO ABX. Pt reports intermittent episodes of pain 

since then, however reports he was tolerating a regular diet and having regular 

BMs. He does admit to some dark stools and bright red blood per rectum a few 

times.


On this admission, pt had a CT abdomen/pelvis in the ER which showed some 

inflammatory changes and possible mass at the hepatic flexure. GI and surgery 

consulted to evaluate. Pt underwent a colonoscopy with GI this morning which 

showed partially obstructing mass in the ascending colon 90cm from the anus with

some bleeding.


Currently, pt is resting comfortably in bed. States his abdominal pain is well 

controlled at this time. He states his last BM was this morning and it was loose

due to the enema. Denies any BPR, denies nausea/vomiting, fevers/chills, chest 

pain or SOB. Pt does admit to about a 20lb weight loss in the last 3 months 

without trying. 





PMHx: denies


PSHx: appendectomy, sinus sx


SocialHx: 5-6 cigs/day x 1 yr, 1 bottle of Shania/week x 1 yr, occasional 

cocaine use


FamilyHx: denies hx of cancer in family 


NKDA  





Review of Systems





- Review of Systems


All systems: reviewed and no additional remarkable complaints except (as per 

HPI)





Past Patient History





- Past Social History


Alcohol: Occasional


Drugs: Denies





- CARDIAC


Hx Cardiac Disorders: No





- PULMONARY


Hx Respiratory Disorders: No





- NEUROLOGICAL


Hx Neurological Disorder: No





- HEENT


Hx HEENT Problems: No





- RENAL


Hx Chronic Kidney Disease: No





- ENDOCRINE/METABOLIC


Hx Endocrine Disorders: No





- HEMATOLOGICAL/ONCOLOGICAL


Hx Blood Disorders: No





- INTEGUMENTARY


Hx Dermatological Problems: No





- MUSCULOSKELETAL/RHEUMATOLOGICAL


Hx Musculoskeletal Disorders: No


Hx Falls: No





- GASTROINTESTINAL


Hx Gastrointestinal Disorders: Yes


Hx Colitis: Yes





- GENITOURINARY/GYNECOLOGICAL


Hx Genitourinary Disorders: No





- PSYCHIATRIC


Hx Substance Use: Yes (ectasy)





- SURGICAL HISTORY


Hx Appendectomy: Yes





- ANESTHESIA


Hx Anesthesia: Yes


Hx Anesthesia Reactions: No


Hx Malignant Hyperthermia: No





Meds


Allergies/Adverse Reactions: 


                                    Allergies











Allergy/AdvReac Type Severity Reaction Status Date / Time


 


No Known Allergies Allergy   Verified 10/11/18 17:48














- Medications


Medications: 


                               Current Medications





Dextrose (Dextrose 5% In Water 1000 Ml)  1,000 mls @ 120 mls/hr IV .Q8H20M [facility]


   Stop: 10/13/18 05:42


   Last Admin: 10/12/18 06:21 Dose:  120 mls/hr


Morphine Sulfate (Morphine)  4 mg IVP Q4 PRN


   PRN Reason: Pain, severe (8-10)


   Last Admin: 10/12/18 06:25 Dose:  4 mg


Ondansetron HCl (Zofran Inj)  4 mg IVP Q6 PRN


   PRN Reason: Nausea/Vomiting


   Last Admin: 10/12/18 08:39 Dose:  4 mg











Physical Exam





- Constitutional


Appears: Well, No Acute Distress





- Head Exam


Head Exam: ATRAUMATIC, NORMOCEPHALIC





- Eye Exam


Eye Exam: Normal appearance





- ENT Exam


ENT Exam: Mucous Membranes Moist





- Respiratory Exam


Respiratory Exam: NORMAL BREATHING PATTERN





- Cardiovascular Exam


Cardiovascular Exam: RRR





- GI/Abdominal Exam


GI & Abdominal Exam: Soft, Tenderness (in the right upper and lower quadrants ).

 absent: Distended, Guarding





- Extremities Exam


Extremities exam: Negative for: calf tenderness





- Neurological Exam


Neurological exam: Alert, Oriented x3





- Skin


Skin Exam: Dry, Warm





Results





- Vital Signs


Recent Vital Signs: 


                                Last Vital Signs











Temp  97 F L  10/12/18 10:10


 


Pulse  67   10/12/18 10:10


 


Resp  14   10/12/18 10:10


 


BP  110/67   10/12/18 10:10


 


Pulse Ox  100   10/12/18 10:10














- Labs


Result Diagrams: 


                                 10/11/18 18:30





                                 10/11/18 18:30


Labs: 


                         Laboratory Results - last 24 hr











  10/11/18 10/11/18 10/11/18





  18:30 18:30 18:30


 


WBC   8.7 


 


RBC   4.15 L 


 


Hgb   8.8 L 


 


Hct   28.1 L 


 


MCV   67.8 L 


 


MCH   21.1 L 


 


MCHC   31.1 L 


 


RDW   17.3 H 


 


Plt Count   303 


 


MPV   7.8 


 


Neut % (Auto)   79.4 H 


 


Lymph % (Auto)   11.8 L 


 


Mono % (Auto)   6.5 


 


Eos % (Auto)   2.0 


 


Baso % (Auto)   0.3 


 


Neut # (Auto)   6.9 


 


Lymph # (Auto)   1.0 


 


Mono # (Auto)   0.6 


 


Eos # (Auto)   0.2 


 


Baso # (Auto)   0.0 


 


PT    11.9


 


INR    1.1


 


APTT    29.7


 


Sodium  140  


 


Potassium  3.8  


 


Chloride  109 H  


 


Carbon Dioxide  23  


 


Anion Gap  12  


 


BUN  11  


 


Creatinine  0.8  


 


Est GFR ( Amer)  > 60  


 


Est GFR (Non-Af Amer)  > 60  


 


Random Glucose  108  


 


Lactic Acid   


 


Calcium  9.1  


 


Magnesium  2.0  


 


Total Bilirubin  0.1 L  


 


AST  24  


 


ALT  27  


 


Alkaline Phosphatase  95  


 


Total Protein  7.2  


 


Albumin  4.0  


 


Globulin  3.2  


 


Albumin/Globulin Ratio  1.2  


 


Urine Color   


 


Urine Clarity   


 


Urine pH   


 


Ur Specific Gravity   


 


Urine Protein   


 


Urine Glucose (UA)   


 


Urine Ketones   


 


Urine Blood   


 


Urine Nitrate   


 


Urine Bilirubin   


 


Urine Urobilinogen   


 


Ur Leukocyte Esterase   


 


Urine RBC (Auto)   














  10/12/18 10/12/18





  05:15 05:27


 


WBC  


 


RBC  


 


Hgb  


 


Hct  


 


MCV  


 


MCH  


 


MCHC  


 


RDW  


 


Plt Count  


 


MPV  


 


Neut % (Auto)  


 


Lymph % (Auto)  


 


Mono % (Auto)  


 


Eos % (Auto)  


 


Baso % (Auto)  


 


Neut # (Auto)  


 


Lymph # (Auto)  


 


Mono # (Auto)  


 


Eos # (Auto)  


 


Baso # (Auto)  


 


PT  


 


INR  


 


APTT  


 


Sodium  


 


Potassium  


 


Chloride  


 


Carbon Dioxide  


 


Anion Gap  


 


BUN  


 


Creatinine  


 


Est GFR ( Amer)  


 


Est GFR (Non-Af Amer)  


 


Random Glucose  


 


Lactic Acid  1.2 


 


Calcium  


 


Magnesium  


 


Total Bilirubin  


 


AST  


 


ALT  


 


Alkaline Phosphatase  


 


Total Protein  


 


Albumin  


 


Globulin  


 


Albumin/Globulin Ratio  


 


Urine Color   Colorless


 


Urine Clarity   Clear


 


Urine pH   6.0


 


Ur Specific Gravity   1.005


 


Urine Protein   Negative


 


Urine Glucose (UA)   Neg


 


Urine Ketones   Negative


 


Urine Blood   Negative


 


Urine Nitrate   Negative


 


Urine Bilirubin   Negative


 


Urine Urobilinogen   0.2-1.0


 


Ur Leukocyte Esterase   Neg


 


Urine RBC (Auto)   1














- Imaging and Cardiology


  ** CT scan - abdomen


Status: Image reviewed by me





Assessment & Plan





- Assessment and Plan (Free Text)


Assessment: 





42M with partially obstructing colon mass suspected adenocarcinoma s/p 

colonoscopy 


Plan: 





- f/u pathology results


- cont liquid diet as tolerated and monitor bowel function 


- will plan for surgery once pathology is back as pt is not clinically 

obstructed at this time 


- further recs per Dr. Celi Zee 





<Ang Alatorre - Last Filed: 10/12/18 12:13>





History of Present Illness





- History of Present Illness


History of Present Illness: 





Patient was seen and examined at the bedside.  Agree with resident's note above.





Meds





- Medications


Medications: 


                               Current Medications





Dextrose (Dextrose 5% In Water 1000 Ml)  1,000 mls @ 120 mls/hr IV .Q8H20M [facility]


   Stop: 10/13/18 05:42


   Last Admin: 10/12/18 06:21 Dose:  120 mls/hr


Morphine Sulfate (Morphine)  4 mg IVP Q4 PRN


   PRN Reason: Pain, severe (8-10)


   Last Admin: 10/12/18 06:25 Dose:  4 mg


Ondansetron HCl (Zofran Inj)  4 mg IVP Q6 PRN


   PRN Reason: Nausea/Vomiting


   Last Admin: 10/12/18 08:39 Dose:  4 mg











Physical Exam





- Psychiatric Exam


Psychiatric exam: Normal Affect, Normal Mood





Results





- Vital Signs


Recent Vital Signs: 


                                Last Vital Signs











Temp  97 F L  10/12/18 10:10


 


Pulse  67   10/12/18 10:10


 


Resp  14   10/12/18 10:10


 


BP  110/67   10/12/18 10:10


 


Pulse Ox  100   10/12/18 10:10














- Labs


Result Diagrams: 


                                 10/12/18 11:33





                                 10/11/18 18:30


Labs: 


                         Laboratory Results - last 24 hr











  10/11/18 10/11/18 10/11/18





  18:30 18:30 18:30


 


WBC   8.7 


 


RBC   4.15 L 


 


Hgb   8.8 L 


 


Hct   28.1 L 


 


MCV   67.8 L 


 


MCH   21.1 L 


 


MCHC   31.1 L 


 


RDW   17.3 H 


 


Plt Count   303 


 


MPV   7.8 


 


Neut % (Auto)   79.4 H 


 


Lymph % (Auto)   11.8 L 


 


Mono % (Auto)   6.5 


 


Eos % (Auto)   2.0 


 


Baso % (Auto)   0.3 


 


Neut # (Auto)   6.9 


 


Lymph # (Auto)   1.0 


 


Mono # (Auto)   0.6 


 


Eos # (Auto)   0.2 


 


Baso # (Auto)   0.0 


 


PT    11.9


 


INR    1.1


 


APTT    29.7


 


Sodium  140  


 


Potassium  3.8  


 


Chloride  109 H  


 


Carbon Dioxide  23  


 


Anion Gap  12  


 


BUN  11  


 


Creatinine  0.8  


 


Est GFR ( Amer)  > 60  


 


Est GFR (Non-Af Amer)  > 60  


 


Random Glucose  108  


 


Lactic Acid   


 


Calcium  9.1  


 


Magnesium  2.0  


 


Total Bilirubin  0.1 L  


 


AST  24  


 


ALT  27  


 


Alkaline Phosphatase  95  


 


Total Protein  7.2  


 


Albumin  4.0  


 


Globulin  3.2  


 


Albumin/Globulin Ratio  1.2  


 


LDL Cholesterol Direct   


 


Thyroxine (T4)   


 


Urine Color   


 


Urine Clarity   


 


Urine pH   


 


Ur Specific Gravity   


 


Urine Protein   


 


Urine Glucose (UA)   


 


Urine Ketones   


 


Urine Blood   


 


Urine Nitrate   


 


Urine Bilirubin   


 


Urine Urobilinogen   


 


Ur Leukocyte Esterase   


 


Urine RBC (Auto)   














  10/12/18 10/12/18 10/12/18





  05:15 05:27 11:33


 


WBC    7.9


 


RBC    4.28 L


 


Hgb    9.1 L


 


Hct    28.8 L


 


MCV    67.4 L


 


MCH    21.3 L


 


MCHC    31.5 L


 


RDW    17.1 H


 


Plt Count    270


 


MPV    7.7


 


Neut % (Auto)    77.8 H


 


Lymph % (Auto)    10.8 L


 


Mono % (Auto)    8.0


 


Eos % (Auto)    3.1


 


Baso % (Auto)    0.3


 


Neut # (Auto)    6.1


 


Lymph # (Auto)    0.9 L


 


Mono # (Auto)    0.6


 


Eos # (Auto)    0.2


 


Baso # (Auto)    0.0


 


PT   


 


INR   


 


APTT   


 


Sodium   


 


Potassium   


 


Chloride   


 


Carbon Dioxide   


 


Anion Gap   


 


BUN   


 


Creatinine   


 


Est GFR ( Amer)   


 


Est GFR (Non-Af Amer)   


 


Random Glucose   


 


Lactic Acid  1.2  


 


Calcium   


 


Magnesium   


 


Total Bilirubin   


 


AST   


 


ALT   


 


Alkaline Phosphatase   


 


Total Protein   


 


Albumin   


 


Globulin   


 


Albumin/Globulin Ratio   


 


LDL Cholesterol Direct   


 


Thyroxine (T4)   


 


Urine Color   Colorless 


 


Urine Clarity   Clear 


 


Urine pH   6.0 


 


Ur Specific Gravity   1.005 


 


Urine Protein   Negative 


 


Urine Glucose (UA)   Neg 


 


Urine Ketones   Negative 


 


Urine Blood   Negative 


 


Urine Nitrate   Negative 


 


Urine Bilirubin   Negative 


 


Urine Urobilinogen   0.2-1.0 


 


Ur Leukocyte Esterase   Neg 


 


Urine RBC (Auto)   1 














  10/12/18





  11:33


 


WBC 


 


RBC 


 


Hgb 


 


Hct 


 


MCV 


 


MCH 


 


MCHC 


 


RDW 


 


Plt Count 


 


MPV 


 


Neut % (Auto) 


 


Lymph % (Auto) 


 


Mono % (Auto) 


 


Eos % (Auto) 


 


Baso % (Auto) 


 


Neut # (Auto) 


 


Lymph # (Auto) 


 


Mono # (Auto) 


 


Eos # (Auto) 


 


Baso # (Auto) 


 


PT 


 


INR 


 


APTT 


 


Sodium 


 


Potassium 


 


Chloride 


 


Carbon Dioxide 


 


Anion Gap 


 


BUN 


 


Creatinine 


 


Est GFR ( Amer) 


 


Est GFR (Non-Af Amer) 


 


Random Glucose 


 


Lactic Acid 


 


Calcium 


 


Magnesium 


 


Total Bilirubin 


 


AST 


 


ALT 


 


Alkaline Phosphatase 


 


Total Protein 


 


Albumin 


 


Globulin 


 


Albumin/Globulin Ratio 


 


LDL Cholesterol Direct  81


 


Thyroxine (T4)  8.02


 


Urine Color 


 


Urine Clarity 


 


Urine pH 


 


Ur Specific Gravity 


 


Urine Protein 


 


Urine Glucose (UA) 


 


Urine Ketones 


 


Urine Blood 


 


Urine Nitrate 


 


Urine Bilirubin 


 


Urine Urobilinogen 


 


Ur Leukocyte Esterase 


 


Urine RBC (Auto) 














Assessment & Plan





- Assessment and Plan (Free Text)


Plan: 





- Will follow

## 2018-10-12 NOTE — CP.PCM.PN
Subjective





- Date & Time of Evaluation


Date of Evaluation: 10/12/18


Time of Evaluation: 11:21





- Subjective


Subjective: 





Just received a call for a consult on pt. He just had a colonoscopy and was 

found to have a mass in the descending colon.Pt is still in the reciovery 

room.Will see him tomorrow.





Objective





- Vital Signs/Intake and Output


Vital Signs (last 24 hours): 


                                        











Temp Pulse Resp BP Pulse Ox


 


 97 F L  67   14   110/67   100 


 


 10/12/18 10:10  10/12/18 10:10  10/12/18 10:10  10/12/18 10:10  10/12/18 10:10








Intake and Output: 


                                        











 10/12/18 10/12/18





 06:59 18:59


 


Intake Total  100


 


Balance  100














- Medications


Medications: 


                               Current Medications





Dextrose (Dextrose 5% In Water 1000 Ml)  1,000 mls @ 120 mls/hr IV .Q8H20M YANIRA


   Stop: 10/13/18 05:42


   Last Admin: 10/12/18 06:21 Dose:  120 mls/hr


Morphine Sulfate (Morphine)  4 mg IVP Q4 PRN


   PRN Reason: Pain, severe (8-10)


   Last Admin: 10/12/18 06:25 Dose:  4 mg


Ondansetron HCl (Zofran Inj)  4 mg IVP Q6 PRN


   PRN Reason: Nausea/Vomiting


   Last Admin: 10/12/18 08:39 Dose:  4 mg











- Labs


Labs: 


                                        





                                 10/11/18 18:30 





                                 10/11/18 18:30 





                                        











PT  11.9 Seconds (9.8-13.1)   10/11/18  18:30    


 


INR  1.1   10/11/18  18:30    


 


APTT  29.7 Seconds (25.6-37.1)   10/11/18  18:30

## 2018-10-13 RX ADMIN — DEXTROSE AND SODIUM CHLORIDE SCH MLS/HR: 5; 450 INJECTION, SOLUTION INTRAVENOUS at 08:12

## 2018-10-13 RX ADMIN — DEXTROSE AND SODIUM CHLORIDE SCH: 5; 450 INJECTION, SOLUTION INTRAVENOUS at 08:12

## 2018-10-13 NOTE — CP.PCM.PN
Subjective





- Date & Time of Evaluation


Date of Evaluation: 10/13/18


Time of Evaluation: 14:00





- Subjective


Subjective: 





F/U Mass of Colon.





Pt c/o of RLQ pain, on clear fluid.





Objective





- Vital Signs/Intake and Output


Vital Signs (last 24 hours): 


                                        











Temp Pulse Resp BP Pulse Ox


 


 98.7 F   62   18   110/63   100 


 


 10/13/18 08:03  10/13/18 08:03  10/13/18 08:03  10/13/18 08:03  10/13/18 08:03











- Medications


Medications: 


                               Current Medications





Morphine Sulfate (Morphine)  4 mg IVP Q4 PRN


   PRN Reason: Pain, severe (8-10)


   Last Admin: 10/13/18 11:08 Dose:  4 mg


Ondansetron HCl (Zofran Inj)  4 mg IVP Q6 PRN


   PRN Reason: Nausea/Vomiting


   Last Admin: 10/12/18 17:38 Dose:  4 mg











- Labs


Labs: 


                                        





                                 10/12/18 11:33 





                                 10/12/18 11:33 





                                        











PT  11.9 Seconds (9.8-13.1)   10/11/18  18:30    


 


INR  1.1   10/11/18  18:30    


 


APTT  29.7 Seconds (25.6-37.1)   10/11/18  18:30    














- Constitutional


Appears: No Acute Distress





- Head Exam


Head Exam: NORMAL INSPECTION





- Eye Exam


Eye Exam: PERRL





- ENT Exam


ENT Exam: Normal Exam





- Neck Exam


Neck Exam: Normal Inspection





- Respiratory Exam


Respiratory Exam: NORMAL BREATHING PATTERN





- Cardiovascular Exam


Cardiovascular Exam: REGULAR RHYTHM





- GI/Abdominal Exam


GI & Abdominal Exam: Soft, Tenderness ( RLQ > RUQ).  absent: Guarding, Rebound





- Extremities Exam


Extremities Exam: Normal Inspection





- Back Exam


Back Exam: NORMAL INSPECTION





- Neurological Exam


Neurological Exam: Alert, Oriented x3





- Psychiatric Exam


Psychiatric exam: Normal Mood





- Skin


Skin Exam: Warm





Assessment and Plan


(1) Mass of colon


Status: Acute   





(2) Abdominal pain


Status: Acute   





- Assessment and Plan (Free Text)


Plan: 





Discussed with Dr Alatorre f/u Pathology report on Monday, there after to be 

scheduled for surgery.

## 2018-10-13 NOTE — CP.PCM.PN
<Katherine Zee - Last Filed: 10/13/18 08:07>





Subjective





- Date & Time of Evaluation


Date of Evaluation: 10/13/18


Time of Evaluation: 08:07





- Subjective


Subjective: 





Surgery: Dr. Alatorre





Pt seen and examined. No acute overnight events. Pt states he still has some 

abdominal pain but it's worse upon palpation. He admits to tolerating liquids 

and denies nausea/vomiting. Pt admits to flatus but hasn't had any more BMs 

since yesterday. Denies fevers/chills. 





Objective





- Vital Signs/Intake and Output


Vital Signs (last 24 hours): 


                                        











Temp Pulse Resp BP Pulse Ox


 


 98.7 F   62   18   110/63   100 


 


 10/13/18 08:03  10/13/18 08:03  10/13/18 08:03  10/13/18 08:03  10/13/18 08:03











- Medications


Medications: 


                               Current Medications





Dextrose/Sodium Chloride (Dextrose 5%/0.45% Ns 1000 Ml)  1,000 mls @ 100 mls/hr 

IV .Q10H YANIRA


   Stop: 10/13/18 17:39


   Last Admin: 10/12/18 17:55 Dose:  100 mls/hr


Morphine Sulfate (Morphine)  4 mg IVP Q4 PRN


   PRN Reason: Pain, severe (8-10)


   Last Admin: 10/12/18 22:00 Dose:  4 mg


Ondansetron HCl (Zofran Inj)  4 mg IVP Q6 PRN


   PRN Reason: Nausea/Vomiting


   Last Admin: 10/12/18 17:38 Dose:  4 mg











- Labs


Labs: 


                                        





                                 10/12/18 11:33 





                                 10/12/18 11:33 





                                        











PT  11.9 Seconds (9.8-13.1)   10/11/18  18:30    


 


INR  1.1   10/11/18  18:30    


 


APTT  29.7 Seconds (25.6-37.1)   10/11/18  18:30    














- Constitutional


Appears: No Acute Distress





- Head Exam


Head Exam: ATRAUMATIC, NORMOCEPHALIC





- Eye Exam


Eye Exam: Normal appearance





- ENT Exam


ENT Exam: Mucous Membranes Moist





- Respiratory Exam


Respiratory Exam: NORMAL BREATHING PATTERN





- Cardiovascular Exam


Cardiovascular Exam: RRR





- GI/Abdominal Exam


GI & Abdominal Exam: Soft, Tenderness (right upper/lower quadrants mostly ).  

absent: Distended, Guarding, Rebound





- Extremities Exam


Extremities Exam: absent: Calf Tenderness





- Neurological Exam


Neurological Exam: Alert, Awake, Oriented x3





- Skin


Skin Exam: Dry, Warm





Assessment and Plan





- Assessment and Plan (Free Text)


Assessment: 





42M with mass in ascending colon/hepatic flexure suspicious for adenocarcinoma 

s/p colonoscopy 


Plan: 





- cont on liquid diet until surgery due to partially obstructing mass


- will f/u path results and plan for OR accordingly 


- CT chest/abdomen/pelvis negative for mets


- if continues to have bowel function, pt may be amenable to outpt f/u next week

for elective colon resection 


- will d/w Dr. Celi Zee  





<Ang Alatorre - Last Filed: 10/13/18 17:55>





Subjective





- Date & Time of Evaluation


Time of Evaluation: 17:35





- Subjective


Subjective: 





Patient was seen and examined at the bedside.  Agree with resident's note above.

 Tolerating clear liquid diet, states that abdominal pain has improved, passing 

flatus, no bowel movements today.





Objective





- Vital Signs/Intake and Output


Vital Signs (last 24 hours): 


                                        











Temp Pulse Resp BP Pulse Ox


 


 98 F   58 L  18   127/80   100 


 


 10/13/18 16:12  10/13/18 16:12  10/13/18 16:12  10/13/18 16:12  10/13/18 16:12











- Medications


Medications: 


                               Current Medications





Morphine Sulfate (Morphine)  4 mg IVP Q4 PRN


   PRN Reason: Pain, severe (8-10)


   Last Admin: 10/13/18 16:19 Dose:  4 mg


Ondansetron HCl (Zofran Inj)  4 mg IVP Q6 PRN


   PRN Reason: Nausea/Vomiting


   Last Admin: 10/12/18 17:38 Dose:  4 mg











- Labs


Labs: 


                                        





                                 10/12/18 11:33 





                                 10/12/18 11:33 





                                        











PT  11.9 Seconds (9.8-13.1)   10/11/18  18:30    


 


INR  1.1   10/11/18  18:30    


 


APTT  29.7 Seconds (25.6-37.1)   10/11/18  18:30    














- GI/Abdominal Exam


Additional comments: 





soft, mildly tender on the right side, ND, BS+, no rebound, no guarding





Assessment and Plan





- Assessment and Plan (Free Text)


Plan: 





- Will await for pathology


- Will require colectomy once pathology is back


- Will follow

## 2018-10-13 NOTE — CP.PCM.CON
History of Present Illness





- History of Present Illness


History of Present Illness: 





This is a 42 yrs old male who was seen on 10/1 for abdominal pain and was 

treated with cipro and flagyl, but he stopped taking the medicine early because 

he could not afford it. This time he came in for rectal bleeding on a couple of 

occasions, and recurrent suprapubic pain., which then spread to the LLQ. He had 

no loss of appetite, nausea or vomiting..Yesterday he had a colonoscopy which 

showed a core lesion in the sigmoid colon, and thickening of the ascending colon

as well. CT scan did not show any liver metastasis.


P/H h/o alcohol abuse with 1 hennesy followed by beer every 2 weeks,


Smokes 5-6 cigs /day, has also used cocaine due to depression from divorce





Past Patient History





- Past Social History


Smoking Status: Light Smoker < 10 Cigarettes Daily


Alcohol: Occasional


Drugs: Denies


Home Situation {Lives}: Alone





- CARDIAC


Hx Cardiac Disorders: No





- PULMONARY


Hx Respiratory Disorders: No





- NEUROLOGICAL


Hx Neurological Disorder: No





- HEENT


Hx HEENT Problems: No





- RENAL


Hx Chronic Kidney Disease: No





- ENDOCRINE/METABOLIC


Hx Endocrine Disorders: No





- HEMATOLOGICAL/ONCOLOGICAL


Hx Blood Disorders: No





- INTEGUMENTARY


Hx Dermatological Problems: No





- MUSCULOSKELETAL/RHEUMATOLOGICAL


Hx Musculoskeletal Disorders: No


Hx Falls: No





- GASTROINTESTINAL


Hx Gastrointestinal Disorders: Yes


Hx Colitis: Yes





- GENITOURINARY/GYNECOLOGICAL


Hx Genitourinary Disorders: No





- PSYCHIATRIC


Hx Psychophysiologic Disorder: Yes


Hx Depression: Yes


Hx Substance Use: Yes (ectasy)





- SURGICAL HISTORY


Hx Surgeries: Yes


Hx Appendectomy: Yes





- ANESTHESIA


Hx Anesthesia: Yes


Hx Anesthesia Reactions: No


Hx Malignant Hyperthermia: No





Meds


Allergies/Adverse Reactions: 


                                    Allergies











Allergy/AdvReac Type Severity Reaction Status Date / Time


 


No Known Allergies Allergy   Verified 10/11/18 17:48














- Medications


Medications: 


                               Current Medications





Dextrose/Sodium Chloride (Dextrose 5%/0.45% Ns 1000 Ml)  1,000 mls @ 100 mls/hr 

IV .Q10H YANIRA


   Stop: 10/13/18 17:39


   Last Admin: 10/13/18 08:12 Dose:  100 mls/hr


Morphine Sulfate (Morphine)  4 mg IVP Q4 PRN


   PRN Reason: Pain, severe (8-10)


   Last Admin: 10/13/18 11:08 Dose:  4 mg


Ondansetron HCl (Zofran Inj)  4 mg IVP Q6 PRN


   PRN Reason: Nausea/Vomiting


   Last Admin: 10/12/18 17:38 Dose:  4 mg











Physical Exam





- Additional Findings


Additional findings: 





Physical exam; Alert,well oriented in no acute distress


neck; Supple, no adenopathty


Chest; Air entry good , mo or rhonchi


Heart; RSR, no murmur


Abd; Soft, no mass palpable, but pt has tenderness in the LLQ





Results





- Vital Signs


Recent Vital Signs: 


                                Last Vital Signs











Temp  98.7 F   10/13/18 08:03


 


Pulse  62   10/13/18 08:03


 


Resp  18   10/13/18 08:03


 


BP  110/63   10/13/18 08:03


 


Pulse Ox  100   10/13/18 08:03














- Labs


Result Diagrams: 


                                 10/12/18 11:33





                                 10/12/18 11:33


Labs: 


                         Laboratory Results - last 24 hr











  10/12/18 10/12/18 10/12/18





  11:33 11:33 11:33


 


WBC  7.9  


 


RBC  4.28 L  


 


Hgb  9.1 L  


 


Hct  28.8 L  


 


MCV  67.4 L  


 


MCH  21.3 L  


 


MCHC  31.5 L  


 


RDW  17.1 H  


 


Plt Count  270  


 


MPV  7.7  


 


Neut % (Auto)  77.8 H  


 


Lymph % (Auto)  10.8 L  


 


Mono % (Auto)  8.0  


 


Eos % (Auto)  3.1  


 


Baso % (Auto)  0.3  


 


Neut # (Auto)  6.1  


 


Lymph # (Auto)  0.9 L  


 


Mono # (Auto)  0.6  


 


Eos # (Auto)  0.2  


 


Baso # (Auto)  0.0  


 


Sodium   138 


 


Potassium   4.5 


 


Chloride   104 


 


Carbon Dioxide   28 


 


Anion Gap   11 


 


BUN   9 


 


Creatinine   0.8 


 


Est GFR ( Amer)   > 60 


 


Est GFR (Non-Af Amer)   > 60 


 


Random Glucose   111 H 


 


Calcium   8.8 


 


Total Bilirubin   0.2 


 


AST   22 


 


ALT   26 


 


Alkaline Phosphatase   84 


 


Total Protein   7.0 


 


Albumin   3.7 


 


Globulin   3.3 


 


Albumin/Globulin Ratio   1.1 


 


Triglycerides   156 H 


 


Cholesterol   154 


 


LDL Cholesterol Direct   81 


 


HDL Cholesterol   42 


 


Carcinoembryonic Ag   


 


Thyroxine (T4)   8.02 


 


TSH 3rd Generation   2.54 


 


HIV-1 Ab Rapid Screen    Non reactive














  10/12/18





  11:33


 


WBC 


 


RBC 


 


Hgb 


 


Hct 


 


MCV 


 


MCH 


 


MCHC 


 


RDW 


 


Plt Count 


 


MPV 


 


Neut % (Auto) 


 


Lymph % (Auto) 


 


Mono % (Auto) 


 


Eos % (Auto) 


 


Baso % (Auto) 


 


Neut # (Auto) 


 


Lymph # (Auto) 


 


Mono # (Auto) 


 


Eos # (Auto) 


 


Baso # (Auto) 


 


Sodium 


 


Potassium 


 


Chloride 


 


Carbon Dioxide 


 


Anion Gap 


 


BUN 


 


Creatinine 


 


Est GFR ( Amer) 


 


Est GFR (Non-Af Amer) 


 


Random Glucose 


 


Calcium 


 


Total Bilirubin 


 


AST 


 


ALT 


 


Alkaline Phosphatase 


 


Total Protein 


 


Albumin 


 


Globulin 


 


Albumin/Globulin Ratio 


 


Triglycerides 


 


Cholesterol 


 


LDL Cholesterol Direct 


 


HDL Cholesterol 


 


Carcinoembryonic Ag  10.4 H


 


Thyroxine (T4) 


 


TSH 3rd Generation 


 


HIV-1 Ab Rapid Screen 














Assessment & Plan





- Assessment and Plan (Free Text)


Assessment: 





impression; Mass in the left decending colon, final path report not yet 

available


               H/o alcohol abuse

## 2018-10-14 NOTE — CP.PCM.PN
Subjective





- Date & Time of Evaluation


Date of Evaluation: 10/14/18


Time of Evaluation: 10:30





- Subjective


Subjective: 





F/U Colon Mass





No c/o of abdominal pain now, feels better today, tolerating liquid diet well.





Objective





- Vital Signs/Intake and Output


Vital Signs (last 24 hours): 


                                        











Temp Pulse Resp BP Pulse Ox


 


 98.9 F   84   18   123/78   100 


 


 10/14/18 16:56  10/14/18 16:56  10/14/18 16:56  10/14/18 16:56  10/14/18 16:56











- Medications


Medications: 


                               Current Medications





Morphine Sulfate (Morphine)  4 mg IVP Q4 PRN


   PRN Reason: Pain, severe (8-10)


   Last Admin: 10/13/18 22:26 Dose:  4 mg


Ondansetron HCl (Zofran Inj)  4 mg IVP Q6 PRN


   PRN Reason: Nausea/Vomiting


   Last Admin: 10/12/18 17:38 Dose:  4 mg











- Labs


Labs: 


                                        





                                 10/12/18 11:33 





                                 10/12/18 11:33 





                                        











PT  11.9 Seconds (9.8-13.1)   10/11/18  18:30    


 


INR  1.1   10/11/18  18:30    


 


APTT  29.7 Seconds (25.6-37.1)   10/11/18  18:30    














- Constitutional


Appears: No Acute Distress





- Head Exam


Head Exam: NORMAL INSPECTION





- Eye Exam


Eye Exam: PERRL





- ENT Exam


ENT Exam: Normal Exam





- Neck Exam


Neck Exam: Normal Inspection





- Respiratory Exam


Respiratory Exam: Clear to Ausculation Bilateral





- Cardiovascular Exam


Cardiovascular Exam: REGULAR RHYTHM





- GI/Abdominal Exam


GI & Abdominal Exam: Soft, Tenderness (RUQ> RLQ).  absent: Guarding, Rebound





- Extremities Exam


Extremities Exam: Normal Inspection





- Back Exam


Back Exam: NORMAL INSPECTION





- Neurological Exam


Neurological Exam: Alert, Oriented x3


Additional comments: 





No motor/sensory deficit.





- Psychiatric Exam


Psychiatric exam: Normal Mood





- Skin


Skin Exam: Warm





Assessment and Plan


(1) Mass of colon


Status: Acute   





(2) Abdominal pain


Status: Acute   





- Assessment and Plan (Free Text)


Plan: 





Continue Liquid diet, f/u Pathology report on Monday.  Surgical consultant 

awaiting for Pathology for OR next week.

## 2018-10-14 NOTE — CP.PCM.PN
<Katherine Zee - Last Filed: 10/14/18 07:37>





Subjective





- Date & Time of Evaluation


Date of Evaluation: 10/14/18


Time of Evaluation: 07:37





- Subjective


Subjective: 





Surgery: Dr. Alatorre





Pt seen and examined. No acute events overnight. States he feels well and denies

any complaints at this time. He admits to tolerating liquid diet and states he's

hungry and would like to eat more. Admits to some cramping in the abdomen that 

he attributes to "hunger pains." Admits to flatus. Denies nausea/vomiting, 

fevers/chills. 





Objective





- Vital Signs/Intake and Output


Vital Signs (last 24 hours): 


                                        











Temp Pulse Resp BP Pulse Ox


 


 97.7 F   61   19   118/61   98 


 


 10/14/18 00:00  10/14/18 00:00  10/14/18 00:00  10/14/18 00:00  10/14/18 00:00











- Medications


Medications: 


                               Current Medications





Morphine Sulfate (Morphine)  4 mg IVP Q4 PRN


   PRN Reason: Pain, severe (8-10)


   Last Admin: 10/13/18 22:26 Dose:  4 mg


Ondansetron HCl (Zofran Inj)  4 mg IVP Q6 PRN


   PRN Reason: Nausea/Vomiting


   Last Admin: 10/12/18 17:38 Dose:  4 mg











- Labs


Labs: 


                                        





                                 10/12/18 11:33 





                                 10/12/18 11:33 





                                        











PT  11.9 Seconds (9.8-13.1)   10/11/18  18:30    


 


INR  1.1   10/11/18  18:30    


 


APTT  29.7 Seconds (25.6-37.1)   10/11/18  18:30    














- Constitutional


Appears: Well, No Acute Distress





- Head Exam


Head Exam: ATRAUMATIC, NORMOCEPHALIC





- Eye Exam


Eye Exam: Normal appearance





- ENT Exam


ENT Exam: Mucous Membranes Moist





- Respiratory Exam


Respiratory Exam: NORMAL BREATHING PATTERN





- Cardiovascular Exam


Cardiovascular Exam: RRR





- GI/Abdominal Exam


GI & Abdominal Exam: Soft, Tenderness (RLQ ).  absent: Distended, Guarding, 

Rebound





- Neurological Exam


Neurological Exam: Alert, Awake, Oriented x3





- Skin


Skin Exam: Dry, Warm





Assessment and Plan





- Assessment and Plan (Free Text)


Assessment: 





42M with ascending colon/hepatic flexure mass suspicious for adenocarcinoma s/p 

colonoscopy


Plan: 





- cont liquid diet as pt is partially obstructed 


- f/u path


- plan for OR this coming week pending path results


- d/w Dr. Celi Zee 





<Ang Alatorre - Last Filed: 10/14/18 17:00>





Subjective





- Date & Time of Evaluation


Time of Evaluation: 16:30





- Subjective


Subjective: 





Patient was seen and examined at the bedside.  Agree with resident's note above.





Objective





- Vital Signs/Intake and Output


Vital Signs (last 24 hours): 


                                        











Temp Pulse Resp BP Pulse Ox


 


 98.9 F   84   18   123/78   100 


 


 10/14/18 16:56  10/14/18 16:56  10/14/18 16:56  10/14/18 16:56  10/14/18 16:56











- Medications


Medications: 


                               Current Medications





Morphine Sulfate (Morphine)  4 mg IVP Q4 PRN


   PRN Reason: Pain, severe (8-10)


   Last Admin: 10/13/18 22:26 Dose:  4 mg


Ondansetron HCl (Zofran Inj)  4 mg IVP Q6 PRN


   PRN Reason: Nausea/Vomiting


   Last Admin: 10/12/18 17:38 Dose:  4 mg











- Labs


Labs: 


                                        





                                 10/12/18 11:33 





                                 10/12/18 11:33 





                                        











PT  11.9 Seconds (9.8-13.1)   10/11/18  18:30    


 


INR  1.1   10/11/18  18:30    


 


APTT  29.7 Seconds (25.6-37.1)   10/11/18  18:30

## 2018-10-15 LAB
% IRON SATURATION: 4 % (ref 20–55)
FERRITIN SERPL-MCNC: 9.1 NG/ML (ref 17.9–464)
FOLATE SERPL-MCNC: 9.1 NG/ML
IRON SERPL-MCNC: 19 UG/DL (ref 49–181)
TIBC SERPL-MCNC: 476 UG/DL (ref 250–450)
VIT B12 SERPL-MCNC: 550 PG/ML (ref 239–931)

## 2018-10-15 RX ADMIN — ERYTHROPOIETIN SCH UNIT: 10000 INJECTION, SOLUTION INTRAVENOUS; SUBCUTANEOUS at 18:29

## 2018-10-15 NOTE — CP.PCM.PN
Subjective





- Date & Time of Evaluation


Date of Evaluation: 10/15/18


Time of Evaluation: 12:00





- Subjective


Subjective: 





Patient without complaint. Had colonoscopy Friday.





Objective





- Vital Signs/Intake and Output


Vital Signs (last 24 hours): 


                                        











Temp Pulse Resp BP Pulse Ox


 


 98.4 F   61   18   109/74   100 


 


 10/15/18 16:32  10/15/18 16:32  10/15/18 16:32  10/15/18 16:32  10/15/18 16:32











- Medications


Medications: 


                               Current Medications





Epoetin Srinivasan (Procrit)  10,000 unit SC MWF [facility]


   Last Admin: 10/15/18 18:29 Dose:  10,000 unit


Iron Sucrose 100 mg/ Sodium (Chloride)  105 mls @ 105 mls/hr IVPB DAILY [facility]


   Last Admin: 10/15/18 17:00 Dose:  105 mls/hr


Morphine Sulfate (Morphine)  4 mg IVP Q4 PRN


   PRN Reason: Pain, severe (8-10)


   Last Admin: 10/15/18 16:55 Dose:  4 mg


Ondansetron HCl (Zofran Inj)  4 mg IVP Q6 PRN


   PRN Reason: Nausea/Vomiting


   Last Admin: 10/12/18 17:38 Dose:  4 mg


Pantoprazole Sodium (Protonix Inj)  40 mg IVP DAILY [facility]


   Last Admin: 10/15/18 17:00 Dose:  40 mg











- Labs


Labs: 


                                        





                                 10/12/18 11:33 





                                 10/12/18 11:33 





                                        











PT  11.9 Seconds (9.8-13.1)   10/11/18  18:30    


 


INR  1.1   10/11/18  18:30    


 


APTT  29.7 Seconds (25.6-37.1)   10/11/18  18:30    














- Head Exam


Head Exam: ATRAUMATIC





- Eye Exam


Eye Exam: Normal appearance





- ENT Exam


ENT Exam: Mucous Membranes Moist





- Respiratory Exam


Respiratory Exam: Clear to Ausculation Bilateral





- Cardiovascular Exam


Cardiovascular Exam: REGULAR RHYTHM





- GI/Abdominal Exam


GI & Abdominal Exam: Soft, Normal Bowel Sounds





Assessment and Plan


(1) Mass of colon


Assessment & Plan: 


Biopsy of hepatic flexure colon mass showed moderately differentiated 

adenocarcinoma. CT chest negative and cea somewhat elevated at 10. Patient being

seen by Oncology. For surgical resection Wednesday.


Status: Acute

## 2018-10-15 NOTE — CP.PCM.PCO
Assessment & Plan





- Assessment and Plan (Free Text)


Assessment: 





colon mass bx pathology preliminary report: Adenocarcinoma, mod. differentiated 

as per 


sample sent for MSI


above findings d/w , , Dr. Child and 


pt. scheduled for OR on Wednesday

## 2018-10-15 NOTE — CP.PCM.PN
Subjective





- Date & Time of Evaluation


Date of Evaluation: 10/15/18


Time of Evaluation: 11:00





- Subjective


Subjective: 





F/U Mass of Colon.


Pain RUQ, RLQ





Objective





- Vital Signs/Intake and Output


Vital Signs (last 24 hours): 


                                        











Temp Pulse Resp BP Pulse Ox


 


 98.4 F   61   18   109/74   100 


 


 10/15/18 16:32  10/15/18 16:32  10/15/18 16:32  10/15/18 16:32  10/15/18 16:32











- Medications


Medications: 


                               Current Medications





Epoetin Srinivasan (Procrit)  10,000 unit [facility]


Iron Sucrose 100 mg/ Sodium (Chloride)  105 mls @ 105 mls/hr IVPB DAILY [facility]


   Last Admin: 10/15/18 17:00 Dose:  105 mls/hr


Morphine Sulfate (Morphine)  4 mg IVP Q4 PRN


   PRN Reason: Pain, severe (8-10)


   Last Admin: 10/15/18 16:55 Dose:  4 mg


Ondansetron HCl (Zofran Inj)  4 mg IVP Q6 PRN


   PRN Reason: Nausea/Vomiting


   Last Admin: 10/12/18 17:38 Dose:  4 mg


Pantoprazole Sodium (Protonix Inj)  40 mg IVP DAILY [facility]


   Last Admin: 10/15/18 17:00 Dose:  40 mg











- Labs


Labs: 


                                        





                                 10/12/18 11:33 





                                 10/12/18 11:33 





                                        











PT  11.9 Seconds (9.8-13.1)   10/11/18  18:30    


 


INR  1.1   10/11/18  18:30    


 


APTT  29.7 Seconds (25.6-37.1)   10/11/18  18:30    














- Head Exam


Head Exam: NORMAL INSPECTION





- Eye Exam


Eye Exam: PERRL





- ENT Exam


ENT Exam: Normal Exam





- Neck Exam


Neck Exam: Normal Inspection





- Respiratory Exam


Respiratory Exam: NORMAL BREATHING PATTERN





- Cardiovascular Exam


Cardiovascular Exam: REGULAR RHYTHM





- GI/Abdominal Exam


GI & Abdominal Exam: Tenderness (RUQ, RLQ).  absent: Guarding, Rebound





- Extremities Exam


Extremities Exam: Normal Inspection





- Back Exam


Back Exam: NORMAL INSPECTION





- Neurological Exam


Neurological Exam: Alert, CN II-XII Intact, Oriented x3





- Psychiatric Exam


Psychiatric exam: Normal Mood





- Skin


Skin Exam: Warm





Assessment and Plan


(1) Mass of colon


Status: Acute   





(2) Abdominal pain


Status: Acute   





- Assessment and Plan (Free Text)


Plan: 





Pathology verbal report Adeno Ca, f/u with  Surgical consultant for OR

## 2018-10-16 LAB
APTT BLD: 32.9 SECONDS (ref 25.6–37.1)
BUN SERPL-MCNC: 12 MG/DL (ref 9–20)
CALCIUM SERPL-MCNC: 9.2 MG/DL (ref 8.4–10.2)
ERYTHROCYTE [DISTWIDTH] IN BLOOD BY AUTOMATED COUNT: 17.2 % (ref 11.5–14.5)
GFR NON-AFRICAN AMERICAN: > 60
HGB BLD-MCNC: 9.5 G/DL (ref 12–18)
INR PPP: 1.2
MCH RBC QN AUTO: 20.9 PG (ref 27–31)
MCHC RBC AUTO-ENTMCNC: 31 G/DL (ref 33–37)
MCV RBC AUTO: 67.4 FL (ref 80–94)
PLATELET # BLD: 339 K/UL (ref 130–400)
PROTHROMBIN TIME: 13.2 SECONDS (ref 9.8–13.1)
RBC # BLD AUTO: 4.57 MIL/UL (ref 4.4–5.9)
WBC # BLD AUTO: 6.2 K/UL (ref 4.8–10.8)

## 2018-10-16 NOTE — CP.PCM.PN
Subjective





- Date & Time of Evaluation


Date of Evaluation: 10/16/18


Time of Evaluation: 09:31





- Subjective


Subjective: 





General Surgery





Pt seen and examined this AM.  Tolerating liquids.  (-) N/V.  (+) flatus.  LBM: 

last night 10/15 and normal. Pt aware of pathology findings and is pending OR.





Labs and vitals noted.  





PE


Gen: Pt laying in bed in NAD


Skin: warm and dry


Cardio: s1s2 RRR


Lungs: CTA bilaterally


Abd: Soft ntnd





A/P


Adenocarcinoma of colon


  OR now likely on 10/18 (Thursday) for resection 


  Clear liquids now


  Pt will need bowel prep the afternoon before surgery


  Monitor labs


  





Objective





- Vital Signs/Intake and Output


Vital Signs (last 24 hours): 


                                        











Temp Pulse Resp BP Pulse Ox


 


 97.8 F   78   18   115/62   98 


 


 10/16/18 08:23  10/16/18 08:23  10/16/18 08:23  10/16/18 08:23  10/16/18 08:23











- Medications


Medications: 


                               Current Medications





Epoetin Srinivasan (Procrit)  10,000 unit SC MWF [facility]


   Last Admin: 10/15/18 18:29 Dose:  10,000 unit


Iron Sucrose 100 mg/ Sodium (Chloride)  105 mls @ 105 mls/hr IVPB DAILY [facility]


   Last Admin: 10/16/18 09:20 Dose:  105 mls/hr


Lactated Ringer's (Lactated Ringer's)  1,000 mls @ 125 mls/hr IV .Q8H [facility]


Morphine Sulfate (Morphine)  4 mg IVP Q4 PRN


   PRN Reason: Pain, severe (8-10)


   Last Admin: 10/16/18 00:11 Dose:  4 mg


Ondansetron HCl (Zofran Inj)  4 mg IVP Q6 PRN


   PRN Reason: Nausea/Vomiting


   Last Admin: 10/12/18 17:38 Dose:  4 mg


Pantoprazole Sodium (Protonix Inj)  40 mg IVP DAILY [facility]


   Last Admin: 10/16/18 09:15 Dose:  40 mg











- Labs


Labs: 


                                        





                                 10/16/18 05:40 





                                 10/16/18 05:40 





                                        











PT  11.9 Seconds (9.8-13.1)   10/11/18  18:30    


 


INR  1.1   10/11/18  18:30    


 


APTT  29.7 Seconds (25.6-37.1)   10/11/18  18:30

## 2018-10-16 NOTE — CP.PCM.PN
Subjective





- Date & Time of Evaluation


Date of Evaluation: 10/16/18


Time of Evaluation: 11:29





- Subjective


Subjective: 





GI progress note for Dr. Ara Balderas, PGY-2





Pt S &  E at bedside at 1120





Pt reports occasional intermittent abdominal pain that lasts 3-5 secs.  

Currently tolerating liquid diet.  Denies N & V, F & C. Had BM yesterday. Plans 

for surgical resection either tomorrow or Thurs as per pt. 








Objective





- Vital Signs/Intake and Output


Vital Signs (last 24 hours): 


                                        











Temp Pulse Resp BP Pulse Ox


 


 97.8 F   78   18   115/62   98 


 


 10/16/18 08:23  10/16/18 08:23  10/16/18 08:23  10/16/18 08:23  10/16/18 08:23











- Medications


Medications: 


                               Current Medications





Epoetin Srinivasan (Procrit)  10,000 unit SC MWF [facility]


   Last Admin: 10/15/18 18:29 Dose:  10,000 unit


Iron Sucrose 100 mg/ Sodium (Chloride)  105 mls @ 105 mls/hr IVPB DAILY [facility]


   Last Admin: 10/16/18 09:20 Dose:  105 mls/hr


Lactated Ringer's (Lactated Ringer's)  1,000 mls @ 125 mls/hr IV .Q8H [facility]


Morphine Sulfate (Morphine)  4 mg IVP Q4 PRN


   PRN Reason: Pain, severe (8-10)


   Last Admin: 10/16/18 00:11 Dose:  4 mg


Ondansetron HCl (Zofran Inj)  4 mg IVP Q6 PRN


   PRN Reason: Nausea/Vomiting


   Last Admin: 10/12/18 17:38 Dose:  4 mg


Pantoprazole Sodium (Protonix Inj)  40 mg IVP DAILY [facility]


   Last Admin: 10/16/18 09:15 Dose:  40 mg











- Labs


Labs: 


                                        





                                 10/16/18 05:40 





                                 10/16/18 05:40 





                                        











PT  11.9 Seconds (9.8-13.1)   10/11/18  18:30    


 


INR  1.1   10/11/18  18:30    


 


APTT  29.7 Seconds (25.6-37.1)   10/11/18  18:30    














- Constitutional


Appears: Non-toxic, No Acute Distress





- Head Exam


Head Exam: ATRAUMATIC, NORMAL INSPECTION, NORMOCEPHALIC





- Eye Exam


Eye Exam: EOMI, Normal appearance





- ENT Exam


ENT Exam: Mucous Membranes Moist, Normal Exam





- Neck Exam


Neck Exam: Full ROM, Normal Inspection





- Respiratory Exam


Respiratory Exam: Clear to Ausculation Bilateral, NORMAL BREATHING PATTERN





- Cardiovascular Exam


Cardiovascular Exam: REGULAR RHYTHM, +S1, +S2





- GI/Abdominal Exam


GI & Abdominal Exam: Soft.  absent: Distended, Firm, Guarding, Tenderness





- Extremities Exam


Extremities Exam: Normal Inspection





- Neurological Exam


Neurological Exam: Alert, Awake, CN II-XII Intact, Oriented x3





- Psychiatric Exam


Psychiatric exam: Normal Affect, Normal Mood





- Skin


Skin Exam: Dry, Intact, Normal Color, Warm





Assessment and Plan





- Assessment and Plan (Free Text)


Assessment: 





42M w/moderately differentiated adenocarcinoma





Plan: 





Plan for surgical resection Wed or Thurs


Followed by oncology


Will follow





Sherrie, PGY-2

## 2018-10-16 NOTE — CP.PCM.PN
Subjective





- Date & Time of Evaluation


Date of Evaluation: 10/16/18


Time of Evaluation: 12:20





- Subjective


Subjective: 





F/U Colon Mass.





Pt c/o of mild RLQ pain.





Objective





- Vital Signs/Intake and Output


Vital Signs (last 24 hours): 


                                        











Temp Pulse Resp BP Pulse Ox


 


 97.8 F   78   18   115/62   98 


 


 10/16/18 08:23  10/16/18 08:23  10/16/18 08:23  10/16/18 08:23  10/16/18 08:23











- Medications


Medications: 


                               Current Medications





Epoetin Srinivasan (Procrit)  10,000 unit SC MWF [facility]


   Last Admin: 10/15/18 18:29 Dose:  10,000 unit


Iron Sucrose 100 mg/ Sodium (Chloride)  105 mls @ 105 mls/hr IVPB DAILY [facility]


   Last Admin: 10/16/18 09:20 Dose:  105 mls/hr


Lactated Ringer's (Lactated Ringer's)  1,000 mls @ 125 mls/hr IV .Q8H [facility]


Morphine Sulfate (Morphine)  4 mg IVP Q4 PRN


   PRN Reason: Pain, severe (8-10)


   Last Admin: 10/16/18 00:11 Dose:  4 mg


Ondansetron HCl (Zofran Inj)  4 mg IVP Q6 PRN


   PRN Reason: Nausea/Vomiting


   Last Admin: 10/12/18 17:38 Dose:  4 mg


Pantoprazole Sodium (Protonix Inj)  40 mg IVP DAILY [facility]


   Last Admin: 10/16/18 09:15 Dose:  40 mg











- Labs


Labs: 


                                        





                                 10/16/18 05:40 





                                 10/16/18 05:40 





                                        











PT  11.9 Seconds (9.8-13.1)   10/11/18  18:30    


 


INR  1.1   10/11/18  18:30    


 


APTT  29.7 Seconds (25.6-37.1)   10/11/18  18:30    














- Constitutional


Appears: No Acute Distress





- Head Exam


Head Exam: NORMAL INSPECTION





- Eye Exam


Eye Exam: PERRL





- ENT Exam


ENT Exam: Normal Exam





- Neck Exam


Neck Exam: Normal Inspection





- Respiratory Exam


Respiratory Exam: NORMAL BREATHING PATTERN





- Cardiovascular Exam


Cardiovascular Exam: REGULAR RHYTHM





- GI/Abdominal Exam


GI & Abdominal Exam: Tenderness (RLQ > RUQ).  absent: Guarding, Rebound ( )





- Extremities Exam


Extremities Exam: Normal Inspection





- Back Exam


Back Exam: NORMAL INSPECTION





- Neurological Exam


Neurological Exam: Alert, Oriented x3





- Psychiatric Exam


Psychiatric exam: Normal Mood





- Skin


Skin Exam: Warm





Assessment and Plan


(1) Mass of colon


Status: Deleted   





(2) Abdominal pain


Status: Acute   





- Assessment and Plan (Free Text)


Plan: 





Pathology for Colon Mass: Adenocarcinoma.  Planing for surgical resection 

tomorrow 10/17/18.   F/U Hematology, GI and Surgical consultant.

## 2018-10-17 LAB
ALBUMIN SERPL-MCNC: 4.2 G/DL (ref 3.5–5)
ALBUMIN/GLOB SERPL: 1.2 {RATIO} (ref 1–2.1)
ALT SERPL-CCNC: 40 U/L (ref 21–72)
APTT BLD: 32.9 SECONDS (ref 25.6–37.1)
AST SERPL-CCNC: 41 U/L (ref 17–59)
BASOPHILS # BLD AUTO: 0 K/UL (ref 0–0.2)
BASOPHILS NFR BLD: 0.8 % (ref 0–2)
BUN SERPL-MCNC: 12 MG/DL (ref 9–20)
CALCIUM SERPL-MCNC: 9.2 MG/DL (ref 8.4–10.2)
EOSINOPHIL # BLD AUTO: 0.4 K/UL (ref 0–0.7)
EOSINOPHIL NFR BLD: 6.2 % (ref 0–4)
ERYTHROCYTE [DISTWIDTH] IN BLOOD BY AUTOMATED COUNT: 17.4 % (ref 11.5–14.5)
GFR NON-AFRICAN AMERICAN: > 60
HGB BLD-MCNC: 9.5 G/DL (ref 12–18)
INR PPP: 1.2
LYMPHOCYTES # BLD AUTO: 1.3 K/UL (ref 1–4.3)
LYMPHOCYTES NFR BLD AUTO: 22.2 % (ref 20–40)
MCH RBC QN AUTO: 21 PG (ref 27–31)
MCHC RBC AUTO-ENTMCNC: 31.3 G/DL (ref 33–37)
MCV RBC AUTO: 67.2 FL (ref 80–94)
MONOCYTES # BLD: 0.6 K/UL (ref 0–0.8)
MONOCYTES NFR BLD: 10.7 % (ref 0–10)
NEUTROPHILS # BLD: 3.5 K/UL (ref 1.8–7)
NEUTROPHILS NFR BLD AUTO: 60.1 % (ref 50–75)
NRBC BLD AUTO-RTO: 0 % (ref 0–0)
PLATELET # BLD: 375 K/UL (ref 130–400)
PMV BLD AUTO: 7.6 FL (ref 7.2–11.7)
PROTHROMBIN TIME: 13.3 SECONDS (ref 9.8–13.1)
RBC # BLD AUTO: 4.53 MIL/UL (ref 4.4–5.9)
WBC # BLD AUTO: 5.9 K/UL (ref 4.8–10.8)

## 2018-10-17 PROCEDURE — 0DTF0ZZ RESECTION OF RIGHT LARGE INTESTINE, OPEN APPROACH: ICD-10-PCS | Performed by: SURGERY

## 2018-10-17 PROCEDURE — 0DBU4ZZ EXCISION OF OMENTUM, PERCUTANEOUS ENDOSCOPIC APPROACH: ICD-10-PCS | Performed by: SURGERY

## 2018-10-17 RX ADMIN — ERYTHROPOIETIN SCH UNIT: 10000 INJECTION, SOLUTION INTRAVENOUS; SUBCUTANEOUS at 21:38

## 2018-10-17 RX ADMIN — HYDROMORPHONE HYDROCHLORIDE PRN MG: 1 INJECTION, SOLUTION INTRAMUSCULAR; INTRAVENOUS; SUBCUTANEOUS at 13:05

## 2018-10-17 RX ADMIN — HYDROMORPHONE HYDROCHLORIDE PRN MG: 1 INJECTION, SOLUTION INTRAMUSCULAR; INTRAVENOUS; SUBCUTANEOUS at 12:35

## 2018-10-17 RX ADMIN — HYDROMORPHONE HYDROCHLORIDE PRN MG: 1 INJECTION, SOLUTION INTRAMUSCULAR; INTRAVENOUS; SUBCUTANEOUS at 12:50

## 2018-10-17 NOTE — PCM.SURG1
Surgeon's Initial Post Op Note





- Surgeon's Notes


Surgeon: Celi


Assistant: Dar


Type of Anesthesia: General Endo


Anesthesia Administered By: Kee


Pre-Operative Diagnosis: Colon cancer


Operative Findings: Hepatic flexure mass


Post-Operative Diagnosis: same


Operation Performed: Laparoscopic Hand-Assisted Right hemicolectomy, partial 

omentectomy


Specimen/Specimens Removed: Right colon, omentum


Estimated Blood Loss: EBL {In ML}: 100


Blood Products Given: N/A


Drains Used: No Drains


Post-Op Condition: Good


Date of Surgery/Procedure: 10/17/18


Time of Surgery/Procedure: 09:25

## 2018-10-18 LAB
BASOPHILS # BLD AUTO: 0 K/UL (ref 0–0.2)
BASOPHILS NFR BLD: 0.4 % (ref 0–2)
BUN SERPL-MCNC: 8 MG/DL (ref 9–20)
CALCIUM SERPL-MCNC: 8.2 MG/DL (ref 8.4–10.2)
EOSINOPHIL # BLD AUTO: 0.1 K/UL (ref 0–0.7)
EOSINOPHIL NFR BLD: 1 % (ref 0–4)
ERYTHROCYTE [DISTWIDTH] IN BLOOD BY AUTOMATED COUNT: 17.5 % (ref 11.5–14.5)
GFR NON-AFRICAN AMERICAN: > 60
HGB BLD-MCNC: 8.8 G/DL (ref 12–18)
LYMPHOCYTES # BLD AUTO: 0.7 K/UL (ref 1–4.3)
LYMPHOCYTES NFR BLD AUTO: 10.3 % (ref 20–40)
MCH RBC QN AUTO: 20.9 PG (ref 27–31)
MCHC RBC AUTO-ENTMCNC: 30.8 G/DL (ref 33–37)
MCV RBC AUTO: 68 FL (ref 80–94)
MONOCYTES # BLD: 0.6 K/UL (ref 0–0.8)
MONOCYTES NFR BLD: 9.2 % (ref 0–10)
NEUTROPHILS # BLD: 5.3 K/UL (ref 1.8–7)
NEUTROPHILS NFR BLD AUTO: 79.1 % (ref 50–75)
NRBC BLD AUTO-RTO: 0 % (ref 0–0)
PLATELET # BLD: 325 K/UL (ref 130–400)
PMV BLD AUTO: 7.6 FL (ref 7.2–11.7)
RBC # BLD AUTO: 4.21 MIL/UL (ref 4.4–5.9)
WBC # BLD AUTO: 6.7 K/UL (ref 4.8–10.8)

## 2018-10-18 RX ADMIN — ENOXAPARIN SODIUM SCH: 40 INJECTION SUBCUTANEOUS at 11:35

## 2018-10-18 NOTE — CP.PCM.PCO
Physician Communication Note





- Physician Communication Note


Physician Communication Note: patient evaluated and discussed with Dr. Rayo. He 

agrees to renew Janeth JANG

## 2018-10-18 NOTE — CP.PCM.PN
Subjective





- Date & Time of Evaluation


Date of Evaluation: 10/18/18


Time of Evaluation: 10:25





- Subjective


Subjective: 





Pt is in a fair amount of discomfort after the surgery. He had a right 

hemicolectomy and the mass at the hepatic flexure was resected. Final pathology 

awaited.





Objective





- Vital Signs/Intake and Output


Vital Signs (last 24 hours): 


                                        











Temp Pulse Resp BP Pulse Ox


 


 99.1 F   69   20   109/71   97 


 


 10/18/18 08:10  10/18/18 08:10  10/18/18 08:10  10/18/18 08:10  10/18/18 08:10











- Medications


Medications: 


                               Current Medications





Enoxaparin Sodium (Lovenox)  40 mg SC DAILY [facility]; Protocol


Epoetin Srinivasan (Procrit)  10,000 unit SC [facility]


   Last Admin: 10/17/18 21:38 Dose:  10,000 unit


Hydromorphone HCl (Dilaudid)  1 mg IVP Q3 PRN


   PRN Reason: Pain, severe (8-10)


   Last Admin: 10/18/18 09:43 Dose:  1 mg


Hydromorphone HCl (Dilaudid 0.2 Mg/Ml Pca)  6 mg IV PRN PRN; Protocol


   PRN Reason: Pain, moderate (4-7)


Iron Sucrose 100 mg/ Sodium (Chloride)  105 mls @ 105 mls/hr IVPB DAILY [facility]


   Last Admin: 10/18/18 09:18 Dose:  105 mls/hr


Sodium Chloride (Sodium Chloride 0.9%)  1,000 mls @ 1,000 mls/hr IV .Q1H PRN


   PRN Reason: Hypotension


Lactated Ringer's (Lactated Ringer's)  1,000 mls @ 150 mls/hr IV .Q6H40M [facility]


   Last Admin: 10/18/18 09:22 Dose:  150 mls/hr


Naloxone HCl (Narcan)  0.1 mg IVP Q2M PRN


   PRN Reason: Shortness of Breath


Ondansetron HCl (Zofran Inj)  4 mg IVP Q6 PRN


   PRN Reason: Nausea/Vomiting


   Last Admin: 10/12/18 17:38 Dose:  4 mg


Pantoprazole Sodium (Protonix Inj)  40 mg IVP DAILY [facility]


   Last Admin: 10/18/18 09:19 Dose:  40 mg











- Labs


Labs: 


                                        





                                 10/18/18 05:35 





                                 10/18/18 05:35 





                                        











PT  13.3 Seconds (9.8-13.1)  H  10/17/18  05:25    


 


INR  1.2   10/17/18  05:25    


 


APTT  32.9 Seconds (25.6-37.1)   10/17/18  05:25

## 2018-10-18 NOTE — CP.PCM.PN
Subjective





- Date & Time of Evaluation


Date of Evaluation: 10/18/18


Time of Evaluation: 11:30





- Subjective


Subjective: 


F/U Colon Adenocarcinoma.





S/P Laparoscopic with R Hemicolectomy for Colon Ca yesterday. Pt c/o of 

abdominal pain, more prominent to RUQ, RLQ. 








Objective





- Vital Signs/Intake and Output


Vital Signs (last 24 hours): 


                                        











Temp Pulse Resp BP Pulse Ox


 


 98 F   64   18   117/71   100 


 


 10/18/18 16:09  10/18/18 16:09  10/18/18 16:09  10/18/18 16:09  10/18/18 16:09











- Medications


Medications: 


                               Current Medications





Enoxaparin Sodium (Lovenox)  40 mg SC DAILY [facility]; Protocol


   Last Admin: 10/18/18 11:35 Dose:  Not Given


Epoetin Srinivasan (Procrit)  10,000 unit SC MWF [facility]


   Last Admin: 10/17/18 21:38 Dose:  10,000 unit


Hydromorphone HCl (Dilaudid)  1 mg IVP Q3 PRN


   PRN Reason: Pain, severe (8-10)


   Last Admin: 10/18/18 12:52 Dose:  1 mg


Hydromorphone HCl (Dilaudid 0.2 Mg/Ml Pca)  6 mg IV PRN PRN; Protocol


   PRN Reason: Pain, moderate (4-7)


   Last Admin: 10/18/18 11:38 Dose:  6 mg


Iron Sucrose 100 mg/ Sodium (Chloride)  105 mls @ 105 mls/hr IVPB DAILY [facility]


   Last Admin: 10/18/18 09:18 Dose:  105 mls/hr


Sodium Chloride (Sodium Chloride 0.9%)  1,000 mls @ 1,000 mls/hr IV .Q1H PRN


   PRN Reason: Hypotension


Lactated Ringer's (Lactated Ringer's)  1,000 mls @ 150 mls/hr IV .Q6H40M [facility]


   Last Admin: 10/18/18 16:30 Dose:  Not Given


Naloxone HCl (Narcan)  0.1 mg IVP Q2M PRN


   PRN Reason: Shortness of Breath


Ondansetron HCl (Zofran Inj)  4 mg IVP Q6 PRN


   PRN Reason: Nausea/Vomiting


   Last Admin: 10/12/18 17:38 Dose:  4 mg


Pantoprazole Sodium (Protonix Inj)  40 mg IVP DAILY [facility]


   Last Admin: 10/18/18 09:19 Dose:  40 mg











- Labs


Labs: 


                                        





                                 10/18/18 05:35 





                                 10/18/18 05:35 





                                        











PT  13.3 Seconds (9.8-13.1)  H  10/17/18  05:25    


 


INR  1.2   10/17/18  05:25    


 


APTT  32.9 Seconds (25.6-37.1)   10/17/18  05:25    














- Constitutional


Appears: No Acute Distress





- Head Exam


Head Exam: NORMAL INSPECTION





- Eye Exam


Eye Exam: PERRL





- ENT Exam


ENT Exam: Normal Exam





- Neck Exam


Neck Exam: Normal Inspection





- Respiratory Exam


Respiratory Exam: NORMAL BREATHING PATTERN





- Cardiovascular Exam


Cardiovascular Exam: REGULAR RHYTHM





- GI/Abdominal Exam


GI & Abdominal Exam: Soft, Tenderness (around midline incision with staples in 

place.)





- Extremities Exam


Extremities Exam: Normal Inspection





- Back Exam


Back Exam: NORMAL INSPECTION





- Neurological Exam


Neurological Exam: Alert, Oriented x3





- Psychiatric Exam


Psychiatric exam: Normal Mood





- Skin


Skin Exam: Warm





Assessment and Plan


(1) Colon adenocarcinoma


Status: Acute   





(2) Status post right hemicolectomy


Status: Acute   





(3) Abdominal pain


Status: Acute   





- Assessment and Plan (Free Text)


Plan: 





Continue Clear fluid, Dilaudid and rest of tx. f/u Surgical consultant.

## 2018-10-18 NOTE — OP
PROCEDURE DATE:  10/17/2018



PREOPERATIVE DIAGNOSIS:  Colon cancer.



POSTOPERATIVE DIAGNOSIS:  Colon cancer.



PROCEDURES:  Laparoscopic hand-assisted right hemicolectomy, partial

omentectomy.



SURGEON:  Ang Alatorre MD



ASSISTANT:  Dar.



ANESTHESIOLOGIST:  Jolly Tilley MD



ANESTHESIA:  General endotracheal intubation.



INTRAVENOUS FLUIDS:  Crystalloids.



ESTIMATED BLOOD LOSS:  100 mL.



INTRAOPERATIVE FINDINGS:  Right colon mass, hepatic flexure.



SPECIMENS:  Right colon and omentum.



BRIEF HISTORY:  Mr. Keo Vale is a very pleasant 42-year-old gentleman

who presented to the hospital complaining of abdominal pain and upon

further investigation on a CAT scan, the patient was found to have

obstructing lesion at the hepatic flexure on the CAT scan.  Subsequent to

that, the patient underwent colonoscopy with Dr. Child with findings of

partially obstructing tumor in the right colon.  Biopsy came back as

moderately differentiated adenocarcinoma.  All the risks and benefits of

the procedure were explained to the patient.  With the patient having full

understanding of all the risks and benefits involved, informed consent was

obtained, and the patient was taken to the operating room for the above

stated procedure.



DESCRIPTION OF PROCEDURE:  The patient was brought into the operating room

and placed supine on the operating table.  Bilateral Flowtron boots were

applied to the patient's lower extremities.  After successful induction of

anesthesia and successful endotracheal intubation by the anesthesia team, a

Doan catheter was inserted into the patient's urinary bladder.  Subsequent

to that, the patient's abdomen was prepped with ChloraPrep stick and draped

in a standard surgical fashion.  Prior to the beginning of the procedure,

the patient received prophylactic Ancef and Flagyl antibiotics.  Subsequent

to that, a time-out was called into room and everyone in the room were in

agreement.  Using a 10-blade scalpel knife, approximately 5-cm incision was

made supraumbilically in a longitudinal fashion.  Subsequent to that,

dissection was carried down with electrocautery until the fascial layer was

visualized.  A small nick was made in the fascia.  Subsequent to that, two

Leticia clamps were placed on both ends of the fascia.  At this point in

time, the fascia was opened up a little bit more.  Subsequent to that, the

peritoneal layer was encountered.  The peritoneum was clamped with two

Leticia clamps and transected with Metzenbaum scissors.  At this point in

time, the patient's abdominal cavity was entered.  Once this was

accomplished, the fascial layer was opened up for the entire incision. 

Subsequent to that, two stay sutures of 0 Vicryl were placed in both ends

of the fascia.  Subsequent to that, the hand port was introduced into the

patient's abdomen.  At this point in time, the pneumoperitoneum was

achieved.  Subsequent to that, using 5-mm 0-degree scope, it was introduced

into the patient's abdomen.  The abdomen was inspected.  All the peritoneal

surfaces, right upper quadrant, left upper quadrant, pelvis, right lower

and left lower quadrants were examined as well as the liver.  There

appeared to be no evidence of metastatic disease anywhere.  Upon further

investigation, I was able to visualize the tattoo prior placed by the

gastroenterologist in the hepatic flexure.  At this point in time,

attention was turned to the subxiphoid area.  Using an 11-blade scalpel, a

5-mm incision was made in the transverse fascia.  Subsequent to that, a

5-mm trocar was introduced into the patient's abdomen.  Once this was

accomplished, attention was turned to the right upper quadrant of the

patient's abdomen.  Using #11 blade scalpel knife, another 5-mm incision

was made in a transverse fashion.  Subsequent to that, another 5-mm trocar

was introduced into the patient's abdomen.  At this point in time, using

harmonic scalpel, the right colon was mobilized along the white line of

Toldt.  Subsequent to that, the hepatic flexure was mobilized.  At this

point in time, procedure was converted to open.  The colon was brought out

and exteriorized for the wound.  Subsequent to that, a small nick was made

in the mesentery of the terminal ileum.  Subsequent to that, Leticia clamp

was placed over the prior made defect.  At this point in time, a 75-mm blue

load TAMIKO stapler was fired across the terminal ileum.  Then, attention was

turned to the transverse colon.  Distance measured approximately more than

5 cm from the tumor distally.  The transverse colon was measured out, and a

small nick was made in the mesentery of the transverse colon.  Subsequent

to that, another 75-mm blue load TAMIKO stapler was fired across the

transverse colon distal to the tumor.  At this point in time, the mesentery

of the colon was taken with harmonic scalpel.  Once the specimen was

completely freed up, it was passed off to the Lassiter stand.  At this point in

time, hemostasis was confirmed.  Subsequent to that, transverse colon and

terminal ileum were approximated with 3-0 silk suture in a side-to-side

fashion.  At this point in time, two enterotomies were made; one in

transverse colon and one in terminal ileum.  Two Allis clamps were

introduced into the lumen of the bowel.  At this point in time, a 75-mm

blue load TAMIKO stapler was introduced into the lumens of the bowel, and the

stapler was fired.  Once this was accomplished, the staple line was

inspected for hemostasis.  Hemostasis appeared to be satisfactory.  Once

this was accomplished, the defect of the bowel was closed with TA 60

stapler blue load, and redundant portion was transected with curved Lassiter. 

At this point in time, the staple line was reinforced with a running 3-0

chromic suture.  At this point in time, there appeared to be significant

portion of redundant omentum that was transected off with harmonic scalpel.

Once this was accomplished, anastomosis was reduced back into the abdominal

cavity.  The procedure was converted back to laparoscopic.  The right side

of the abdomen was inspected for hemostasis.  The entire abdomen was

inspected for hemostasis.  Hemostasis appeared to be satisfactory.  So at

this point in time, _____ the hand port was taken out from the patient. 

The laparoscopic trocars were taken out from the patient.  The fascial

layer was closed with #1 looped PDS, one from above and one from below and

tied in the middle.  Prior placed Vicryl sutures were tied as well.  At

this point in time, the wound was irrigated and dried.  Skin was

approximated with staples.  At this point in time, the patient's abdomen

was washed and dried, and a clean dressing was applied to the site of the

incisions.  The patient was successfully extubated by the anesthesia team,

transferred to the stretcher and taken to the recovery room in a stable

condition.  At the end of the procedure, all instrument counts, needles and

sponges were correct.





__________________________________________

Ang Alatorre MD





DD:  10/17/2018 12:32:11

DT:  10/17/2018 23:29:02

Job # [id number]

## 2018-10-18 NOTE — CP.PCM.PN
<Tabatha Tinsley - Last Filed: 10/18/18 09:14>





Subjective





- Date & Time of Evaluation


Date of Evaluation: 10/18/18


Time of Evaluation: 07:00





- Subjective


Subjective: 


GENERAL SURGERY PROGRESS NOTE FOR DR. ALEXIS





Patient seen and examined at bedside. He states that he has abdominal pain which

is only partially relieved by the Dilaudid. He was increased to dilaudid 1 Q3. 

He has not been OOB yet. Remains NPO. Ricardo had 1300cc overnight.





Objective





- Vital Signs/Intake and Output


Vital Signs (last 24 hours): 


                                        











Temp Pulse Resp BP Pulse Ox


 


 99.1 F   69   20   109/71   97 


 


 10/18/18 08:10  10/18/18 08:10  10/18/18 08:10  10/18/18 08:10  10/18/18 08:10











- Medications


Medications: 


                               Current Medications





Enoxaparin Sodium (Lovenox)  40 mg SC DAILY [facility]; Protocol


Epoetin Srinivasan (Procrit)  10,000 unit SC MWF [facility]


   Last Admin: 10/17/18 21:38 Dose:  10,000 unit


Hydromorphone HCl (Dilaudid)  1 mg IVP Q3 PRN


   PRN Reason: Pain, severe (8-10)


   Last Admin: 10/18/18 06:57 Dose:  1 mg


Iron Sucrose 100 mg/ Sodium (Chloride)  105 mls @ 105 mls/hr IVPB DAILY [facility]


   Last Admin: 10/17/18 09:00 Dose:  105 mls/hr


Sodium Chloride (Sodium Chloride 0.9%)  1,000 mls @ 1,000 mls/hr IV .Q1H PRN


   PRN Reason: Hypotension


Lactated Ringer's (Lactated Ringer's)  1,000 mls @ 150 mls/hr IV .Q6H40M [facility]


   Last Admin: 10/17/18 23:34 Dose:  150 mls/hr


Naloxone HCl (Narcan)  0.1 mg IVP Q2M PRN


   PRN Reason: Shortness of Breath


Ondansetron HCl (Zofran Inj)  4 mg IVP Q6 PRN


   PRN Reason: Nausea/Vomiting


   Last Admin: 10/12/18 17:38 Dose:  4 mg


Pantoprazole Sodium (Protonix Inj)  40 mg IVP DAILY [facility]


   Last Admin: 10/17/18 08:45 Dose:  40 mg











- Labs


Labs: 


                                        





                                 10/18/18 05:35 





                                 10/18/18 05:35 





                                        











PT  13.3 Seconds (9.8-13.1)  H  10/17/18  05:25    


 


INR  1.2   10/17/18  05:25    


 


APTT  32.9 Seconds (25.6-37.1)   10/17/18  05:25    














- Constitutional


Appears: Well, Non-toxic, No Acute Distress





- Head Exam


Head Exam: ATRAUMATIC, NORMAL INSPECTION





- Respiratory Exam


Respiratory Exam: NORMAL BREATHING PATTERN.  absent: Respiratory Distress





- Cardiovascular Exam


Cardiovascular Exam: +S1, +S2





- GI/Abdominal Exam


GI & Abdominal Exam: Soft, Tenderness.  absent: Distended, Firm, Guarding, 

Rebound


Additional comments: 


Dressings clean/dry/intact





- Neurological Exam


Neurological Exam: Alert, Awake, Oriented x3





- Psychiatric Exam


Psychiatric exam: Normal Affect, Normal Mood





- Skin


Skin Exam: Dry, Normal Color, Warm





Assessment and Plan





- Assessment and Plan (Free Text)


Assessment: 


43yo M with adenocarcinoma s/p Laparoscopic Hand-Assisted Right hemicolectomy, 

partial omentectomy POD#1





- Good urine output post op, DC ricardo


- Encouraged ambulation and OOB, PT ordered


- Encouraged IS use


- Discussed plan with Dr. Celi Tinsley PGY-4





<Ang Alexis - Last Filed: 10/18/18 12:23>





Subjective





- Date & Time of Evaluation


Time of Evaluation: 12:00





- Subjective


Subjective: 





Patient was seen and examined at the bedside.  Agree with resident's note above.





Objective





- Vital Signs/Intake and Output


Vital Signs (last 24 hours): 


                                        











Temp Pulse Resp BP Pulse Ox


 


 99.1 F   78   20   109/71   98 


 


 10/18/18 08:10  10/18/18 11:21  10/18/18 08:10  10/18/18 08:10  10/18/18 11:21











- Medications


Medications: 


                               Current Medications





Enoxaparin Sodium (Lovenox)  40 mg SC DAILY [facility]; Protocol


   Last Admin: 10/18/18 11:35 Dose:  Not Given


Epoetin Srinivasan (Procrit)  10,000 unit SC MWF [facility]


   Last Admin: 10/17/18 21:38 Dose:  10,000 unit


Hydromorphone HCl (Dilaudid)  1 mg IVP Q3 PRN


   PRN Reason: Pain, severe (8-10)


   Last Admin: 10/18/18 09:43 Dose:  1 mg


Hydromorphone HCl (Dilaudid 0.2 Mg/Ml Pca)  6 mg IV PRN PRN; Protocol


   PRN Reason: Pain, moderate (4-7)


   Last Admin: 10/18/18 11:38 Dose:  6 mg


Iron Sucrose 100 mg/ Sodium (Chloride)  105 mls @ 105 mls/hr IVPB DAILY [facility]


   Last Admin: 10/18/18 09:18 Dose:  105 mls/hr


Sodium Chloride (Sodium Chloride 0.9%)  1,000 mls @ 1,000 mls/hr IV .Q1H PRN


   PRN Reason: Hypotension


Lactated Ringer's (Lactated Ringer's)  1,000 mls @ 150 mls/hr IV .Q6H40M [facility]


   Last Admin: 10/18/18 09:22 Dose:  150 mls/hr


Naloxone HCl (Narcan)  0.1 mg IVP Q2M PRN


   PRN Reason: Shortness of Breath


Ondansetron HCl (Zofran Inj)  4 mg IVP Q6 PRN


   PRN Reason: Nausea/Vomiting


   Last Admin: 10/12/18 17:38 Dose:  4 mg


Pantoprazole Sodium (Protonix Inj)  40 mg IVP DAILY [facility]


   Last Admin: 10/18/18 09:19 Dose:  40 mg











- Labs


Labs: 


                                        





                                 10/18/18 05:35 





                                 10/18/18 05:35 





                                        











PT  13.3 Seconds (9.8-13.1)  H  10/17/18  05:25    


 


INR  1.2   10/17/18  05:25    


 


APTT  32.9 Seconds (25.6-37.1)   10/17/18  05:25    














- GI/Abdominal Exam


Additional comments: 





soft, natalie-incisional tenderness, ND, BS hypoactive, no rebound, no guarding, 

dressing dry, clean, intact





Assessment and Plan





- Assessment and Plan (Free Text)


Plan: 





- Start clear liquid diet


- Pain control


- Insentive spirometry


- d/c ricardo


- DVT ppx


- Out of bed


- Repeat labs in am


- Will follow

## 2018-10-18 NOTE — CP.PCM.PCO
Assessment/Plan





- Assessment and Plan (Free Text)


Assessment: 


S/P lap. hand assisted melvin colectomy and partial omentectomy.





Discussed with Dr. Rayo after evaluating the patient.He agrees with the ff:


- Renew PCA dilaudid for another 24H


- Continue Dilaudid 1mg IVP Q3H prn for breakthrough pain.





- Date & Time


Date: 10/18/18


Time: 09:45

## 2018-10-19 LAB
ANISOCYTOSIS BLD QL SMEAR: (no result)
BASOPHILS # BLD AUTO: 0 K/UL (ref 0–0.2)
BASOPHILS NFR BLD: 0.2 % (ref 0–2)
BUN SERPL-MCNC: 6 MG/DL (ref 9–20)
CALCIUM SERPL-MCNC: 8.4 MG/DL (ref 8.4–10.2)
EOSINOPHIL # BLD AUTO: 0.2 K/UL (ref 0–0.7)
EOSINOPHIL NFR BLD: 2.2 % (ref 0–4)
EOSINOPHIL NFR BLD: 3 % (ref 0–7)
ERYTHROCYTE [DISTWIDTH] IN BLOOD BY AUTOMATED COUNT: 17.4 % (ref 11.5–14.5)
GFR NON-AFRICAN AMERICAN: > 60
HGB BLD-MCNC: 7.9 G/DL (ref 12–18)
HYPOCHROMIC: (no result)
LG PLATELETS BLD QL SMEAR: PRESENT
LYMPHOCYTE: 8 % (ref 20–50)
LYMPHOCYTES # BLD AUTO: 0.7 K/UL (ref 1–4.3)
LYMPHOCYTES NFR BLD AUTO: 7.4 % (ref 20–40)
MACROCYTES BLD QL SMEAR: SLIGHT
MCH RBC QN AUTO: 21.2 PG (ref 27–31)
MCHC RBC AUTO-ENTMCNC: 30.8 G/DL (ref 33–37)
MCV RBC AUTO: 68.8 FL (ref 80–94)
MICROCYTES BLD QL SMEAR: SLIGHT
MONOCYTE: 3 % (ref 0–10)
MONOCYTES # BLD: 0.8 K/UL (ref 0–0.8)
MONOCYTES NFR BLD: 8.4 % (ref 0–10)
NEUTROPHILS # BLD: 8 K/UL (ref 1.8–7)
NEUTROPHILS NFR BLD AUTO: 81.8 % (ref 50–75)
NEUTROPHILS NFR BLD AUTO: 86 % (ref 42–75)
NRBC BLD AUTO-RTO: 0 % (ref 0–0)
OVALOCYTES BLD QL SMEAR: (no result)
PLATELET # BLD EST: NORMAL 10*3/UL
PLATELET # BLD: 293 K/UL (ref 130–400)
PMV BLD AUTO: 7.7 FL (ref 7.2–11.7)
POIKILOCYTOSIS BLD QL SMEAR: SLIGHT
RBC # BLD AUTO: 3.73 MIL/UL (ref 4.4–5.9)
TEARDROP CELLS: SLIGHT
TOTAL CELLS COUNTED BLD: 100
WBC # BLD AUTO: 9.8 K/UL (ref 4.8–10.8)

## 2018-10-19 RX ADMIN — ERYTHROPOIETIN SCH UNIT: 10000 INJECTION, SOLUTION INTRAVENOUS; SUBCUTANEOUS at 10:03

## 2018-10-19 NOTE — CP.PCM.PN
Subjective





- Date & Time of Evaluation


Date of Evaluation: 10/19/18


Time of Evaluation: 12:37





- Subjective


Subjective: 





Patient recovering well. Discussed with patient the fact he will most likely 

need chemotherapy.Should follow with Oncology





Objective





- Vital Signs/Intake and Output


Vital Signs (last 24 hours): 


                                        











Temp Pulse Resp BP Pulse Ox


 


 98.9 F   66   20   124/65   99 


 


 10/19/18 08:04  10/19/18 08:04  10/19/18 08:04  10/19/18 08:04  10/19/18 08:04








Intake and Output: 


                                        











 10/19/18 10/19/18





 06:59 18:59


 


Output Total  800


 


Balance  -800














- Medications


Medications: 


                               Current Medications





Enoxaparin Sodium (Lovenox)  40 mg SC DAILY [facility]; Protocol


   Last Admin: 10/18/18 11:35 Dose:  Not Given


Epoetin Srinivasan (Procrit)  10,000 unit SC F [facility]


   Last Admin: 10/19/18 10:03 Dose:  10,000 unit


Hydromorphone HCl (Dilaudid)  1 mg IVP Q3 PRN


   PRN Reason: Pain, severe (8-10)


   Last Admin: 10/19/18 05:15 Dose:  1 mg


Iron Sucrose 100 mg/ Sodium (Chloride)  105 mls @ 105 mls/hr IVPB DAILY [facility]


   Last Admin: 10/19/18 09:55 Dose:  105 mls/hr


Sodium Chloride (Sodium Chloride 0.9%)  1,000 mls @ 1,000 mls/hr IV .Q1H PRN


   PRN Reason: Hypotension


Lactated Ringer's (Lactated Ringer's)  1,000 mls @ 150 mls/hr IV .Q6H40M [facility]


   Last Admin: 10/19/18 10:02 Dose:  150 mls/hr


Naloxone HCl (Narcan)  0.1 mg IVP Q2M PRN


   PRN Reason: Shortness of Breath


Ondansetron HCl (Zofran Inj)  4 mg IVP Q6 PRN


   PRN Reason: Nausea/Vomiting


   Last Admin: 10/18/18 18:29 Dose:  4 mg


Pantoprazole Sodium (Protonix Inj)  40 mg IVP DAILY [facility]


   Last Admin: 10/19/18 09:56 Dose:  40 mg











- Labs


Labs: 


                                        





                                 10/19/18 05:45 





                                 10/19/18 05:45 





                                        











PT  13.3 Seconds (9.8-13.1)  H  10/17/18  05:25    


 


INR  1.2   10/17/18  05:25    


 


APTT  32.9 Seconds (25.6-37.1)   10/17/18  05:25    














Assessment and Plan


(1) Mass of colon


Status: Acute

## 2018-10-19 NOTE — CP.PCM.PN
Subjective





- Date & Time of Evaluation


Date of Evaluation: 10/19/18





- Subjective


Subjective: 





F/U S/P R Hemicolectomy.





Post surgical abdominal pain improved.





Objective





- Vital Signs/Intake and Output


Vital Signs (last 24 hours): 


                                        











Temp Pulse Resp BP Pulse Ox


 


 98.9 F   66   20   124/65   99 


 


 10/19/18 08:04  10/19/18 08:04  10/19/18 08:04  10/19/18 08:04  10/19/18 08:04








Intake and Output: 


                                        











 10/19/18 10/19/18





 06:59 18:59


 


Output Total  800


 


Balance  -800














- Medications


Medications: 


                               Current Medications





Enoxaparin Sodium (Lovenox)  40 mg SC DAILY [facility]; Protocol


   Last Admin: 10/18/18 11:35 Dose:  Not Given


Epoetin Srinivasan (Procrit)  10,000 unit SC MWF [facility]


   Last Admin: 10/19/18 10:03 Dose:  10,000 unit


Hydromorphone HCl (Dilaudid)  1 mg IVP Q3 PRN


   PRN Reason: Pain, severe (8-10)


   Last Admin: 10/19/18 13:44 Dose:  1 mg


Iron Sucrose 100 mg/ Sodium (Chloride)  105 mls @ 105 mls/hr IVPB DAILY [facility]


   Last Admin: 10/19/18 09:55 Dose:  105 mls/hr


Sodium Chloride (Sodium Chloride 0.9%)  1,000 mls @ 1,000 mls/hr IV .Q1H PRN


   PRN Reason: Hypotension


Lactated Ringer's (Lactated Ringer's)  1,000 mls @ 150 mls/hr IV .Q6H40M [facility]


   Last Admin: 10/19/18 10:02 Dose:  150 mls/hr


Naloxone HCl (Narcan)  0.1 mg IVP Q2M PRN


   PRN Reason: Shortness of Breath


Ondansetron HCl (Zofran Inj)  4 mg IVP Q6 PRN


   PRN Reason: Nausea/Vomiting


   Last Admin: 10/18/18 18:29 Dose:  4 mg


Pantoprazole Sodium (Protonix Inj)  40 mg IVP DAILY [facility]


   Last Admin: 10/19/18 09:56 Dose:  40 mg











- Labs


Labs: 


                                        





                                 10/19/18 05:45 





                                 10/19/18 05:45 





                                        











PT  13.3 Seconds (9.8-13.1)  H  10/17/18  05:25    


 


INR  1.2   10/17/18  05:25    


 


APTT  32.9 Seconds (25.6-37.1)   10/17/18  05:25    














- Constitutional


Appears: No Acute Distress





- Head Exam


Head Exam: NORMAL INSPECTION





- Eye Exam


Eye Exam: PERRL





- ENT Exam


ENT Exam: Normal Exam





- Neck Exam


Neck Exam: Normal Inspection





- Respiratory Exam


Respiratory Exam: NORMAL BREATHING PATTERN





- Cardiovascular Exam


Cardiovascular Exam: REGULAR RHYTHM





- Extremities Exam


Extremities Exam: Normal Inspection





- Back Exam


Back Exam: NORMAL INSPECTION





- Neurological Exam


Neurological Exam: Alert, Oriented x3





- Psychiatric Exam


Psychiatric exam: Normal Mood





- Skin


Skin Exam: Warm





Assessment and Plan


(1) Status post right hemicolectomy


Status: Acute   





(2) Colon adenocarcinoma


Status: Acute   





(3) Abdominal pain


Status: Acute   





(4) Anemia


Status: Acute   





- Assessment and Plan (Free Text)


Plan: 





IV fluid, Dilaudid and rest of Tx.   Hgb 7.9 repeat CBC at 6pm, f/u Surgical /GI

consultant.

## 2018-10-19 NOTE — CP.PCM.PN
<Katherine Zee - Last Filed: 10/19/18 09:22>





Subjective





- Date & Time of Evaluation


Date of Evaluation: 10/19/18


Time of Evaluation: 07:30





- Subjective


Subjective: 





Surgery: Dr. Alatorre





Pt seen and examined. No acute overnight events. Pt states he feels ok but 

continues to have post-op pain around the incision. Admits to nausea yesterday 

after having the clear liquids but denies vomiting. Denies flatus, BM, 

fevers/chills. 





Objective





- Vital Signs/Intake and Output


Vital Signs (last 24 hours): 


                                        











Temp Pulse Resp BP Pulse Ox


 


 98.9 F   66   20   124/65   99 


 


 10/19/18 08:04  10/19/18 08:04  10/19/18 08:04  10/19/18 08:04  10/19/18 08:04











- Medications


Medications: 


                               Current Medications





Enoxaparin Sodium (Lovenox)  40 mg SC [facility]ILY [facility]; [facility]


   Last Admin: 10/18/18 11:35 Dose:  Not Given


Epoetin Srinivasan (Procrit)  10,000 unit SC [facility]


   Last Admin: 10/17/18 21:38 Dose:  10,000 unit


Hydromorphone HCl (Dilaudid)  1 mg IVP Q3 PRN


   PRN Reason: Pain, severe (8-10)


   Last Admin: 10/19/18 05:15 Dose:  1 mg


Hydromorphone HCl (Dilaudid 0.2 Mg/Ml Pca)  6 mg IV PRN PRN; Protocol


   PRN Reason: Pain, moderate (4-7)


   Last Admin: 10/18/18 21:20 Dose:  6 mg


Iron Sucrose 100 mg/ Sodium (Chloride)  105 mls @ 105 mls/hr IVPB [facility]


   Last Admin: 10/18/18 09:18 Dose:  105 mls/hr


Sodium Chloride (Sodium Chloride 0.9%)  1,000 mls @ 1,000 mls/hr IV .Q1H PRN


   PRN Reason: Hypotension


Lactated Ringer's (Lactated Ringer's)  1,000 mls @ 150 mls/hr IV .Q6H40M [facility]


   Last Admin: 10/19/18 00:53 Dose:  150 mls/hr


Naloxone HCl (Narcan)  0.1 mg IVP Q2M PRN


   PRN Reason: Shortness of Breath


Ondansetron HCl (Zofran Inj)  4 mg IVP Q6 PRN


   PRN Reason: Nausea/Vomiting


   Last Admin: 10/18/18 18:29 Dose:  4 mg


Pantoprazole Sodium (Protonix Inj)  40 mg IVP DAILY [facility]


   Last Admin: 10/18/18 09:19 Dose:  40 mg











- Labs


Labs: 


                                        





                                 10/19/18 05:45 





                                 10/19/18 05:45 





                                        











PT  13.3 Seconds (9.8-13.1)  H  10/17/18  05:25    


 


INR  1.2   10/17/18  05:25    


 


APTT  32.9 Seconds (25.6-37.1)   10/17/18  05:25    














- Constitutional


Appears: Well, No Acute Distress





- Head Exam


Head Exam: ATRAUMATIC, NORMOCEPHALIC





- Eye Exam


Eye Exam: Normal appearance





- ENT Exam


ENT Exam: Mucous Membranes Moist





- Respiratory Exam


Respiratory Exam: NORMAL BREATHING PATTERN





- Cardiovascular Exam


Cardiovascular Exam: RRR





- GI/Abdominal Exam


GI & Abdominal Exam: Soft, Tenderness (around midline incision, clean/dry/intact

with staples in place ).  absent: Distended, Rebound





- Neurological Exam


Neurological Exam: Alert, Awake, Oriented x3





- Skin


Skin Exam: Dry, Warm





Assessment and Plan





- Assessment and Plan (Free Text)


Assessment: 





42M s/p lap hand assisted R hemicolectomy for colon andenocarcinoma: POD#2  


Plan: 





- cont liquid diet; will advance slowly once starts to have bowel function


- wean IV pain medication as tolerated 


- encourage ambulation & IS


- f/u pathology


- DVT PPX


- d/w Dr. Celi Zee 





<Ang Alatorre - Last Filed: 10/19/18 09:54>





Subjective





- Date & Time of Evaluation


Time of Evaluation: 09:25





- Subjective


Subjective: 





Patient was seen and examined at the bedside.  Agree with resident's note above.





Objective





- Vital Signs/Intake and Output


Vital Signs (last 24 hours): 


                                        











Temp Pulse Resp BP Pulse Ox


 


 98.9 F   66   20   124/65   99 


 


 10/19/18 08:04  10/19/18 08:04  10/19/18 08:04  10/19/18 08:04  10/19/18 08:04











- Medications


Medications: 


                               Current Medications





Enoxaparin Sodium (Lovenox)  40 mg SC DAILY [facility]; Protocol


   Last Admin: 10/18/18 11:35 Dose:  Not Given


Epoetin Srinivasan (Procrit)  10,000 unit SC [facility]


   Last Admin: 10/17/18 21:38 Dose:  10,000 unit


Hydromorphone HCl (Dilaudid)  1 mg IVP Q3 PRN


   PRN Reason: Pain, severe (8-10)


   Last Admin: 10/19/18 05:15 Dose:  1 mg


Hydromorphone HCl (Dilaudid 0.2 Mg/Ml Pca)  6 mg IV PRN PRN; Protocol


   PRN Reason: Pain, moderate (4-7)


   Last Admin: 10/18/18 21:20 Dose:  6 mg


Iron Sucrose 100 mg/ Sodium (Chloride)  105 mls @ 105 mls/hr IVPB [facility]


   Last Admin: 10/18/18 09:18 Dose:  105 mls/hr


Sodium Chloride (Sodium Chloride 0.9%)  1,000 mls @ 1,000 mls/hr IV .Q1H PRN


   PRN Reason: Hypotension


Lactated Ringer's (Lactated Ringer's)  1,000 mls @ 150 mls/hr IV .Q6H40M [facility]


   Last Admin: 10/19/18 00:53 Dose:  150 mls/hr


Naloxone HCl (Narcan)  0.1 mg IVP Q2M PRN


   PRN Reason: Shortness of Breath


Ondansetron HCl (Zofran Inj)  4 mg IVP Q6 PRN


   PRN Reason: Nausea/Vomiting


   Last Admin: 10/18/18 18:29 Dose:  4 mg


Pantoprazole Sodium (Protonix Inj)  40 mg IVP DAILY [facility]


   Last Admin: 10/18/18 09:19 Dose:  40 mg











- Labs


Labs: 


                                        





                                 10/19/18 05:45 





                                 10/19/18 05:45 





                                        











PT  13.3 Seconds (9.8-13.1)  H  10/17/18  05:25    


 


INR  1.2   10/17/18  05:25    


 


APTT  32.9 Seconds (25.6-37.1)   10/17/18  05:25    














- GI/Abdominal Exam


Additional comments: 





soft, natalie-incisional tenderness, ND, BS hypoactive, no rebound, no guarding, 

incisions clean, no erythema, no drainage, staples in place





Assessment and Plan





- Assessment and Plan (Free Text)


Plan: 





- Repeat labs in am


- Will follow

## 2018-10-20 LAB
ALBUMIN SERPL-MCNC: 3.6 G/DL (ref 3.5–5)
ALBUMIN/GLOB SERPL: 1.1 {RATIO} (ref 1–2.1)
ALT SERPL-CCNC: 33 U/L (ref 21–72)
AST SERPL-CCNC: 38 U/L (ref 17–59)
BASOPHILS # BLD AUTO: 0 K/UL (ref 0–0.2)
BASOPHILS NFR BLD: 0.2 % (ref 0–2)
BUN SERPL-MCNC: 4 MG/DL (ref 9–20)
CALCIUM SERPL-MCNC: 8.6 MG/DL (ref 8.4–10.2)
EOSINOPHIL # BLD AUTO: 0.3 K/UL (ref 0–0.7)
EOSINOPHIL NFR BLD: 4.3 % (ref 0–4)
ERYTHROCYTE [DISTWIDTH] IN BLOOD BY AUTOMATED COUNT: 17.9 % (ref 11.5–14.5)
GFR NON-AFRICAN AMERICAN: > 60
HGB BLD-MCNC: 8.4 G/DL (ref 12–18)
LYMPHOCYTES # BLD AUTO: 1 K/UL (ref 1–4.3)
LYMPHOCYTES NFR BLD AUTO: 13.3 % (ref 20–40)
MCH RBC QN AUTO: 21.2 PG (ref 27–31)
MCHC RBC AUTO-ENTMCNC: 30.3 G/DL (ref 33–37)
MCV RBC AUTO: 69.8 FL (ref 80–94)
MONOCYTES # BLD: 0.6 K/UL (ref 0–0.8)
MONOCYTES NFR BLD: 8.3 % (ref 0–10)
NEUTROPHILS # BLD: 5.4 K/UL (ref 1.8–7)
NEUTROPHILS NFR BLD AUTO: 73.9 % (ref 50–75)
NRBC BLD AUTO-RTO: 0 % (ref 0–0)
PLATELET # BLD: 357 K/UL (ref 130–400)
PMV BLD AUTO: 8.1 FL (ref 7.2–11.7)
RBC # BLD AUTO: 3.96 MIL/UL (ref 4.4–5.9)
WBC # BLD AUTO: 7.3 K/UL (ref 4.8–10.8)

## 2018-10-20 RX ADMIN — ENOXAPARIN SODIUM SCH MG: 40 INJECTION SUBCUTANEOUS at 09:57

## 2018-10-20 RX ADMIN — OXYCODONE HYDROCHLORIDE AND ACETAMINOPHEN PRN TAB: 5; 325 TABLET ORAL at 09:58

## 2018-10-20 RX ADMIN — OXYCODONE HYDROCHLORIDE AND ACETAMINOPHEN PRN TAB: 5; 325 TABLET ORAL at 15:16

## 2018-10-20 RX ADMIN — OXYCODONE HYDROCHLORIDE AND ACETAMINOPHEN PRN TAB: 5; 325 TABLET ORAL at 20:44

## 2018-10-20 NOTE — CP.PCM.PN
Subjective





- Date & Time of Evaluation


Date of Evaluation: 10/20/18


Time of Evaluation: 07:42





- Subjective


Subjective: 





Surgery: Dr. Beasley





Pt seen and examined. No acute overnight events. States he feels ok and pain is 

well controlled with the pain medication. Pt admits to tolerating liquids and 

states he would like to eat solid food. Admits to some "rumbling" in his belly 

but denies flatus/BM. Ambulating, denies N/V, F/C. 





Objective





- Vital Signs/Intake and Output


Vital Signs (last 24 hours): 


                                        











Temp Pulse Resp BP Pulse Ox


 


 100.0 F H  83   20   129/86   98 


 


 10/19/18 23:40  10/19/18 23:40  10/19/18 23:40  10/19/18 23:40  10/19/18 23:40











- Medications


Medications: 


                               Current Medications





Enoxaparin Sodium (Lovenox)  40 mg SC DAILY [facility]; Protocol


   Last Admin: 10/18/18 11:35 Dose:  Not Given


Epoetin Srinivasan (Procrit)  10,000 unit SC [facility]


   Last Admin: 10/19/18 10:03 Dose:  10,000 unit


Hydromorphone HCl (Dilaudid)  1 mg IVP Q3 PRN


   PRN Reason: Pain, severe (8-10)


   Last Admin: 10/20/18 03:13 Dose:  1 mg


Iron Sucrose 100 mg/ Sodium (Chloride)  105 mls @ 105 mls/hr IVPB DAILY [facility]


   Last Admin: 10/19/18 09:55 Dose:  105 mls/hr


Sodium Chloride (Sodium Chloride 0.9%)  1,000 mls @ 1,000 mls/hr IV .Q1H PRN


   PRN Reason: Hypotension


Lactated Ringer's (Lactated Ringer's)  1,000 mls @ 150 mls/hr IV .Q6H40M [facility]


   Last Admin: 10/20/18 06:41 Dose:  150 mls/hr


Naloxone HCl (Narcan)  0.1 mg IVP Q2M PRN


   PRN Reason: Shortness of Breath


Ondansetron HCl (Zofran Inj)  4 mg IVP Q6 PRN


   PRN Reason: Nausea/Vomiting


   Last Admin: 10/18/18 18:29 Dose:  4 mg


Pantoprazole Sodium (Protonix Inj)  40 mg IVP DAILY [facility]


   Last Admin: 10/19/18 09:56 Dose:  40 mg











- Labs


Labs: 


                                        





                                 10/20/18 05:25 





                                 10/19/18 05:45 





                                        











PT  13.3 Seconds (9.8-13.1)  H  10/17/18  05:25    


 


INR  1.2   10/17/18  05:25    


 


APTT  32.9 Seconds (25.6-37.1)   10/17/18  05:25    














- Constitutional


Appears: Well, No Acute Distress





- Head Exam


Head Exam: ATRAUMATIC, NORMOCEPHALIC





- Eye Exam


Eye Exam: Normal appearance





- ENT Exam


ENT Exam: Mucous Membranes Moist





- Respiratory Exam


Respiratory Exam: NORMAL BREATHING PATTERN





- Cardiovascular Exam


Cardiovascular Exam: RRR





- GI/Abdominal Exam


GI & Abdominal Exam: Soft, Tenderness (around midline incision, clean/dry/intact

with staples in place ).  absent: Distended, Guarding, Rebound





- Neurological Exam


Neurological Exam: Alert, Awake, Oriented x3





- Skin


Skin Exam: Dry, Warm





Assessment and Plan





- Assessment and Plan (Free Text)


Assessment: 





42M with colon adenocarcinoma s/p lap hand assist R hemicolectomy; POD#3 


Plan: 





- pathology shows moderately differentiated adenocarcinoma without any lymph 

node invasion; Stage IIA 


- will advance diet slowly as tolerated 


- monitor bowel function


- wean off IV pain meds


- encourage ambulation & IS


- DVT PPX


- d/w Dr. Ramos Zee

## 2018-10-20 NOTE — CP.PCM.PN
Subjective





- Date & Time of Evaluation


Date of Evaluation: 10/20/18





- Subjective


Subjective: 





F/U Colon Ca/S/P Hemicolectomy.








Pt with no abdominal pain now, on clear fluid.





Objective





- Vital Signs/Intake and Output


Vital Signs (last 24 hours): 


                                        











Temp Pulse Resp BP Pulse Ox


 


 98.2 F   55 L  20   101/54 L  100 


 


 10/20/18 08:12  10/20/18 08:12  10/20/18 08:12  10/20/18 08:12  10/20/18 08:12











- Medications


Medications: 


                               Current Medications





Enoxaparin Sodium (Lovenox)  40 mg SC DAILY [facility]; Protocol


   Last Admin: 10/20/18 09:57 Dose:  40 mg


Epoetin Srinivasan (Procrit)  10,000 unit SC [facility]


   Last Admin: 10/19/18 10:03 Dose:  10,000 unit


Iron Sucrose 100 mg/ Sodium (Chloride)  105 mls @ 105 mls/hr IVPB DAILY [facility]


   Last Admin: 10/20/18 09:56 Dose:  105 mls/hr


Sodium Chloride (Sodium Chloride 0.9%)  1,000 mls @ 1,000 mls/hr IV .Q1H PRN


   PRN Reason: Hypotension


Lactated Ringer's (Lactated Ringer's)  1,000 mls @ 75 mls/hr IV .[id number] [facility]


   Stop: 10/22/18 07:52


   Last Admin: 10/20/18 13:03 Dose:  Not Given


Morphine Sulfate (Morphine)  4 mg IVP Q6 PRN


   PRN Reason: Pain, severe (8-10)


Naloxone HCl (Narcan)  0.1 mg IVP Q2M PRN


   PRN Reason: Shortness of Breath


Ondansetron HCl (Zofran Inj)  4 mg IVP Q6 PRN


   PRN Reason: Nausea/Vomiting


   Last Admin: 10/18/18 18:29 Dose:  4 mg


Oxycodone/Acetaminophen (Percocet 5/325 Mg Tab)  1 tab PO Q4 PRN


   PRN Reason: Pain, moderate (4-7)


   Stop: 10/23/18 07:50


   Last Admin: 10/20/18 15:16 Dose:  1 tab


Pantoprazole Sodium (Protonix Inj)  40 mg IVP DAILY [facility]


   Last Admin: 10/20/18 09:57 Dose:  40 mg











- Labs


Labs: 


                                        





                                 10/20/18 05:25 





                                 10/20/18 05:25 





                                        











PT  13.3 Seconds (9.8-13.1)  H  10/17/18  05:25    


 


INR  1.2   10/17/18  05:25    


 


APTT  32.9 Seconds (25.6-37.1)   10/17/18  05:25    














- Constitutional


Appears: No Acute Distress





- Head Exam


Head Exam: NORMAL INSPECTION





- Eye Exam


Eye Exam: PERRL





- ENT Exam


ENT Exam: Normal Exam





- Neck Exam


Neck Exam: Normal Inspection





- Respiratory Exam


Respiratory Exam: Clear to Ausculation Bilateral





- Cardiovascular Exam


Cardiovascular Exam: REGULAR RHYTHM





- GI/Abdominal Exam


GI & Abdominal Exam: Tenderness (around midline incision site with staples in 

place.)





- Extremities Exam


Extremities Exam: Normal Inspection





- Back Exam


Back Exam: NORMAL INSPECTION





- Neurological Exam


Neurological Exam: Alert, Oriented x3





- Psychiatric Exam


Psychiatric exam: Normal Mood





- Skin


Skin Exam: Warm





Assessment and Plan


(1) Status post right hemicolectomy


Status: Acute   





(2) Colon adenocarcinoma


Status: Acute   





(3) Abdominal pain


Status: Acute   





(4) Anemia


Status: Acute   





- Assessment and Plan (Free Text)


Plan: 


On clear fluid, continue Lovenox, Morphine, Procrit, Protonix and rest of Tx.

## 2018-10-21 LAB
ERYTHROCYTE [DISTWIDTH] IN BLOOD BY AUTOMATED COUNT: 18.6 % (ref 11.5–14.5)
ERYTHROCYTE [DISTWIDTH] IN BLOOD BY AUTOMATED COUNT: 18.7 % (ref 11.5–14.5)
HGB BLD-MCNC: 7.8 G/DL (ref 12–18)
HGB BLD-MCNC: 8.5 G/DL (ref 12–18)
MCH RBC QN AUTO: 21.3 PG (ref 27–31)
MCH RBC QN AUTO: 21.3 PG (ref 27–31)
MCHC RBC AUTO-ENTMCNC: 30.5 G/DL (ref 33–37)
MCHC RBC AUTO-ENTMCNC: 30.6 G/DL (ref 33–37)
MCV RBC AUTO: 69.7 FL (ref 80–94)
MCV RBC AUTO: 69.7 FL (ref 80–94)
PLATELET # BLD: 322 K/UL (ref 130–400)
PLATELET # BLD: 359 K/UL (ref 130–400)
RBC # BLD AUTO: 3.67 MIL/UL (ref 4.4–5.9)
RBC # BLD AUTO: 4 MIL/UL (ref 4.4–5.9)
WBC # BLD AUTO: 5.2 K/UL (ref 4.8–10.8)
WBC # BLD AUTO: 6.4 K/UL (ref 4.8–10.8)

## 2018-10-21 RX ADMIN — ENOXAPARIN SODIUM SCH MG: 40 INJECTION SUBCUTANEOUS at 09:38

## 2018-10-21 RX ADMIN — OXYCODONE HYDROCHLORIDE AND ACETAMINOPHEN PRN TAB: 5; 325 TABLET ORAL at 07:54

## 2018-10-21 RX ADMIN — OXYCODONE HYDROCHLORIDE AND ACETAMINOPHEN PRN TAB: 5; 325 TABLET ORAL at 15:51

## 2018-10-21 RX ADMIN — OXYCODONE HYDROCHLORIDE AND ACETAMINOPHEN PRN TAB: 5; 325 TABLET ORAL at 01:25

## 2018-10-21 NOTE — CP.PCM.PN
Subjective





- Date & Time of Evaluation


Date of Evaluation: 10/21/18





- Subjective


Subjective: 





F/U Colon Ca. S/P Hemicolectomy





No abdominal pain, on regular food.





Objective





- Vital Signs/Intake and Output


Vital Signs (last 24 hours): 


                                        











Temp Pulse Resp BP Pulse Ox


 


 98 F   65   20   128/77   100 


 


 10/21/18 08:27  10/21/18 08:27  10/21/18 08:27  10/21/18 08:27  10/21/18 08:27











- Medications


Medications: 


                               Current Medications





Enoxaparin Sodium (Lovenox)  40 mg SC DAILY [facility]; Protocol


   Last Admin: 10/21/18 09:38 Dose:  40 mg


Epoetin Srinivasan (Procrit)  10,000 unit SC MWF [facility]


   Last Admin: 10/19/18 10:03 Dose:  10,000 unit


Iron Sucrose 100 mg/ Sodium (Chloride)  105 mls @ 105 mls/hr IVPB DAILY [facility]


   Last Admin: 10/21/18 09:41 Dose:  105 mls/hr


Sodium Chloride (Sodium Chloride 0.9%)  1,000 mls @ 1,000 mls/hr IV .Q1H PRN


   PRN Reason: Hypotension


Lactated Ringer's (Lactated Ringer's)  1,000 mls @ 75 mls/hr IV .[id number] [facility]


   Stop: 10/22/18 07:52


   Last Admin: 10/21/18 12:13 Dose:  Not Given


Morphine Sulfate (Morphine)  4 mg IVP Q6 PRN


   PRN Reason: Pain, severe (8-10)


   Last Admin: 10/21/18 12:23 Dose:  4 mg


Naloxone HCl (Narcan)  0.1 mg IVP Q2M PRN


   PRN Reason: Shortness of Breath


Ondansetron HCl (Zofran Inj)  4 mg IVP Q6 PRN


   PRN Reason: Nausea/Vomiting


   Last Admin: 10/18/18 18:29 Dose:  4 mg


Oxycodone/Acetaminophen (Percocet 5/325 Mg Tab)  1 tab PO Q4 PRN


   PRN Reason: Pain, moderate (4-7)


   Stop: 10/23/18 07:50


   Last Admin: 10/21/18 07:54 Dose:  1 tab


Pantoprazole Sodium (Protonix Inj)  40 mg IVP DAILY [facility]


   Last Admin: 10/21/18 09:41 Dose:  40 mg











- Labs


Labs: 


                                        





                                 10/21/18 12:20 





                                 10/20/18 05:25 





                                        











PT  13.3 Seconds (9.8-13.1)  H  10/17/18  05:25    


 


INR  1.2   10/17/18  05:25    


 


APTT  32.9 Seconds (25.6-37.1)   10/17/18  05:25    














- Constitutional


Appears: No Acute Distress





- Head Exam


Head Exam: NORMAL INSPECTION





- Eye Exam


Eye Exam: PERRL





- ENT Exam


ENT Exam: Normal Exam





- Neck Exam


Neck Exam: Normal Inspection





- Respiratory Exam


Respiratory Exam: Clear to Ausculation Bilateral





- Cardiovascular Exam


Cardiovascular Exam: REGULAR RHYTHM





- GI/Abdominal Exam


GI & Abdominal Exam: Tenderness (arouns midline incision site), Normal Bowel 

Sounds





- Extremities Exam


Extremities Exam: Normal Inspection





- Back Exam


Back Exam: NORMAL INSPECTION





- Neurological Exam


Neurological Exam: Alert, Oriented x3





- Psychiatric Exam


Psychiatric exam: Normal Mood





- Skin


Skin Exam: Warm





Assessment and Plan


(1) Status post right hemicolectomy


Status: Acute   





(2) Colon adenocarcinoma


Status: Acute   





(3) Abdominal pain


Status: Acute   





(4) Anemia


Status: Acute   





- Assessment and Plan (Free Text)


Plan: 





Monitor Pt on regular food, continue Morphine, Lovenox, Procrit and rest of tx.

## 2018-10-21 NOTE — CP.PCM.PN
Subjective





- Date & Time of Evaluation


Date of Evaluation: 10/21/18


Time of Evaluation: 07:35





- Subjective


Subjective: 





Surgery: Dr. Beasley





Pt seen and examined. No acute overnight events. Pt with post-op pain around the

incision but denies other complaints. Tolerating CLD and denies flatus/BM but 

feels that he has the "urge to go." Denies fevers/chills, nausea/vomiting. 

Ambulating. 





Objective





- Vital Signs/Intake and Output


Vital Signs (last 24 hours): 


                                        











Temp Pulse Resp BP Pulse Ox


 


 98.3 F   69   20   118/81   100 


 


 10/20/18 22:47  10/20/18 22:47  10/20/18 22:47  10/20/18 22:47  10/20/18 22:47











- Medications


Medications: 


                               Current Medications





Enoxaparin Sodium (Lovenox)  40 mg SC DAILY [facility]; Protocol


   Last Admin: 10/20/18 09:57 Dose:  40 mg


Epoetin Srinivasan (Procrit)  10,000 unit SC MWF [facility]


   Last Admin: 10/19/18 10:03 Dose:  10,000 unit


Iron Sucrose 100 mg/ Sodium (Chloride)  105 mls @ 105 mls/hr IVPB DAILY [facility]


   Last Admin: 10/20/18 09:56 Dose:  105 mls/hr


Sodium Chloride (Sodium Chloride 0.9%)  1,000 mls @ 1,000 mls/hr IV .Q1H PRN


   PRN Reason: Hypotension


Lactated Ringer's (Lactated Ringer's)  1,000 mls @ 75 mls/hr IV .[id number] [facility]


   Stop: 10/22/18 07:52


   Last Admin: 10/20/18 21:11 Dose:  Not Given


Morphine Sulfate (Morphine)  4 mg IVP Q6 PRN


   PRN Reason: Pain, severe (8-10)


Naloxone HCl (Narcan)  0.1 mg IVP Q2M PRN


   PRN Reason: Shortness of Breath


Ondansetron HCl (Zofran Inj)  4 mg IVP Q6 PRN


   PRN Reason: Nausea/Vomiting


   Last Admin: 10/18/18 18:29 Dose:  4 mg


Oxycodone/Acetaminophen (Percocet 5/325 Mg Tab)  1 tab PO Q4 PRN


   PRN Reason: Pain, moderate (4-7)


   Stop: 10/23/18 07:50


   Last Admin: 10/21/18 01:25 Dose:  1 tab


Pantoprazole Sodium (Protonix Inj)  40 mg IVP DAILY [facility]


   Last Admin: 10/20/18 09:57 Dose:  40 mg











- Labs


Labs: 


                                        





                                 10/20/18 05:25 





                                 10/20/18 05:25 





                                        











PT  13.3 Seconds (9.8-13.1)  H  10/17/18  05:25    


 


INR  1.2   10/17/18  05:25    


 


APTT  32.9 Seconds (25.6-37.1)   10/17/18  05:25    














- Constitutional


Appears: Well, No Acute Distress





- Head Exam


Head Exam: ATRAUMATIC, NORMOCEPHALIC





- Eye Exam


Eye Exam: Normal appearance





- ENT Exam


ENT Exam: Mucous Membranes Moist





- Respiratory Exam


Respiratory Exam: NORMAL BREATHING PATTERN





- Cardiovascular Exam


Cardiovascular Exam: RRR





- GI/Abdominal Exam


GI & Abdominal Exam: Soft, Tenderness (around midline incision, clean/dry/intact

with staples in place ).  absent: Distended, Guarding





- Extremities Exam


Extremities Exam: absent: Calf Tenderness





- Neurological Exam


Neurological Exam: Alert, Awake, Oriented x3





- Skin


Skin Exam: Dry, Warm





Assessment and Plan





- Assessment and Plan (Free Text)


Assessment: 





42M s/p lap hand assist R hemicolectomy for Stage IIA colon adenocarcinoma; 

POD#4 


Plan: 





- advance to FLD; will slowly advance as tolerated


- monitor bowel function


- encourage ambulation & incentive spirometry


- PO pain control


- d/w Dr. Ramos Zee

## 2018-10-22 VITALS
DIASTOLIC BLOOD PRESSURE: 86 MMHG | OXYGEN SATURATION: 100 % | SYSTOLIC BLOOD PRESSURE: 134 MMHG | HEART RATE: 67 BPM | TEMPERATURE: 98.6 F | RESPIRATION RATE: 18 BRPM

## 2018-10-22 LAB
ERYTHROCYTE [DISTWIDTH] IN BLOOD BY AUTOMATED COUNT: 19.5 % (ref 11.5–14.5)
HGB BLD-MCNC: 8.7 G/DL (ref 12–18)
MCH RBC QN AUTO: 21.7 PG (ref 27–31)
MCHC RBC AUTO-ENTMCNC: 31.1 G/DL (ref 33–37)
MCV RBC AUTO: 69.8 FL (ref 80–94)
PLATELET # BLD: 365 K/UL (ref 130–400)
RBC # BLD AUTO: 4.02 MIL/UL (ref 4.4–5.9)
WBC # BLD AUTO: 5.3 K/UL (ref 4.8–10.8)

## 2018-10-22 RX ADMIN — ENOXAPARIN SODIUM SCH MG: 40 INJECTION SUBCUTANEOUS at 09:37

## 2018-10-22 RX ADMIN — OXYCODONE HYDROCHLORIDE AND ACETAMINOPHEN PRN TAB: 5; 325 TABLET ORAL at 00:48

## 2018-10-22 RX ADMIN — ERYTHROPOIETIN SCH UNIT: 10000 INJECTION, SOLUTION INTRAVENOUS; SUBCUTANEOUS at 09:38

## 2018-10-22 RX ADMIN — OXYCODONE HYDROCHLORIDE PRN MG: 10 TABLET ORAL at 09:36

## 2018-10-22 RX ADMIN — OXYCODONE HYDROCHLORIDE PRN MG: 10 TABLET ORAL at 16:08

## 2018-10-22 NOTE — CP.PCM.DIS
Provider





- Provider


Date of Admission: 


10/11/18 23:09





Attending physician: 


Brady Jose MD








Diagnosis





- Discharge Diagnosis


(1) Status post right hemicolectomy


Status: Acute   





(2) Colon adenocarcinoma


Status: Acute   





(3) Abdominal pain


Status: Acute   Priority: High   





(4) Anemia


Status: Acute   





Hospital Course





- Lab Results


Lab Results: 


                                  Micro Results





10/12/18 05:45   Blood-Venous   Blood Culture - Final


                            NO GROWTH AFTER 5 DAYS


10/12/18 05:45   Blood-Venous   Gram Stain - Final


                            TEST NOT PERFORMED


10/12/18 05:15   Blood-Venous   Blood Culture - Final


                            NO GROWTH AFTER 5 DAYS


10/12/18 05:15   Blood-Venous   Gram Stain - Final


                            TEST NOT PERFORMED





                             Most Recent Lab Values











WBC  5.3 K/uL (4.8-10.8)   10/22/18  09:04    


 


RBC  4.02 Mil/uL (4.40-5.90)  L  10/22/18  09:04    


 


Hgb  8.7 g/dL (12.0-18.0)  L  10/22/18  09:04    


 


Hct  28.1 % (35.0-51.0)  L  10/22/18  09:04    


 


MCV  69.8 fl (80.0-94.0)  L  10/22/18  09:04    


 


MCH  21.7 pg (27.0-31.0)  L  10/22/18  09:04    


 


MCHC  31.1 g/dL (33.0-37.0)  L  10/22/18  09:04    


 


RDW  19.5 % (11.5-14.5)  H  10/22/18  09:04    


 


Plt Count  365 K/uL (130-400)   10/22/18  09:04    


 


MPV  8.1 fl (7.2-11.7)   10/20/18  05:25    


 


Neut % (Auto)  73.9 % (50.0-75.0)   10/20/18  05:25    


 


Lymph % (Auto)  13.3 % (20.0-40.0)  L  10/20/18  05:25    


 


Mono % (Auto)  8.3 % (0.0-10.0)   10/20/18  05:25    


 


Eos % (Auto)  4.3 % (0.0-4.0)  H  10/20/18  05:25    


 


Baso % (Auto)  0.2 % (0.0-2.0)   10/20/18  05:25    


 


Neut # (Auto)  5.4 K/uL (1.8-7.0)   10/20/18  05:25    


 


Lymph # (Auto)  1.0 K/uL (1.0-4.3)   10/20/18  05:25    


 


Mono # (Auto)  0.6 K/uL (0.0-0.8)   10/20/18  05:25    


 


Eos # (Auto)  0.3 K/uL (0.0-0.7)   10/20/18  05:25    


 


Baso # (Auto)  0.0 K/uL (0.0-0.2)   10/20/18  05:25    


 


Neutrophils % (Manual)  86 % (42-75)  H  10/19/18  05:45    


 


Lymphocytes % (Manual)  8 % (20-50)  L  10/19/18  05:45    


 


Monocytes % (Manual)  3 % (0-10)   10/19/18  05:45    


 


Eosinophils % (Manual)  3 % (0-7)   10/19/18  05:45    


 


Platelet Estimate  Normal  (NORMAL)   10/19/18  05:45    


 


Large Platelets  Present   10/19/18  05:45    


 


Hypochromasia (manual)  Moderate   10/19/18  05:45    


 


Poikilocytosis (manual  Slight   10/19/18  05:45    


 


Anisocytosis (manual)  Moderate   10/19/18  05:45    


 


Microcytosis (manual)  Slight   10/19/18  05:45    


 


Macrocytosis (manual)  Slight   10/19/18  05:45    


 


Tear Drop Cells  Slight   10/19/18  05:45    


 


Ovalocytes  Moderate   10/19/18  05:45    


 


PT  13.3 Seconds (9.8-13.1)  H  10/17/18  05:25    


 


INR  1.2   10/17/18  05:25    


 


APTT  32.9 Seconds (25.6-37.1)   10/17/18  05:25    


 


Sodium  139 mmol/l (132-148)   10/20/18  05:25    


 


Potassium  4.0 MMOL/L (3.6-5.0)   10/20/18  05:25    


 


Chloride  102 mmol/L ()   10/20/18  05:25    


 


Carbon Dioxide  28 mmol/L (22-30)   10/20/18  05:25    


 


Anion Gap  13  (10-20)   10/20/18  05:25    


 


BUN  4 mg/dl (9-20)  L  10/20/18  05:25    


 


Creatinine  0.9 mg/dl (0.8-1.5)   10/20/18  05:25    


 


Est GFR ( Amer)  > 60   10/20/18  05:25    


 


Est GFR (Non-Af Amer)  > 60   10/20/18  05:25    


 


Random Glucose  101 mg/dL ()   10/20/18  05:25    


 


Lactic Acid  1.2 MMOL/L (0.7-2.1)   10/12/18  05:15    


 


Calcium  8.6 mg/dL (8.4-10.2)   10/20/18  05:25    


 


Phosphorus  4.3 mg/dl (2.5-4.5)   10/17/18  05:25    


 


Magnesium  2.9 MG/DL (1.6-2.3)  H  10/17/18  05:25    


 


Iron  19 ug/dL ()  L  10/15/18  11:30    


 


TIBC  476 ug/dL (250-450)  H  10/15/18  11:30    


 


% Saturation  4 % (20-55)  L  10/15/18  11:30    


 


Ferritin  9.1 ng/Ml (17.9-464)  L  10/15/18  11:30    


 


Total Bilirubin  0.9 mg/dl (0.2-1.3)   10/20/18  05:25    


 


AST  38 U/L (17-59)   10/20/18  05:25    


 


ALT  33 U/L (21-72)   10/20/18  05:25    


 


Alkaline Phosphatase  88 U/L ()   10/20/18  05:25    


 


Total Protein  6.9 G/DL (6.3-8.2)   10/20/18  05:25    


 


Albumin  3.6 g/dL (3.5-5.0)   10/20/18  05:25    


 


Globulin  3.4 gm/dL (2.2-3.9)   10/20/18  05:25    


 


Albumin/Globulin Ratio  1.1  (1.0-2.1)   10/20/18  05:25    


 


Triglycerides  156 mg/DL (0-149)  H  10/12/18  11:33    


 


Cholesterol  154 mg/dL (0-199)   10/12/18  11:33    


 


LDL Cholesterol Direct  81 mg/dL (0-129)   10/12/18  11:33    


 


HDL Cholesterol  42 MG/DL (30-70)   10/12/18  11:33    


 


Carcinoembryonic Ag  10.4 ng/mL (0-3.0)  H  10/12/18  11:33    


 


Vitamin B12  550 pg/mL (239-931)   10/15/18  11:30    


 


Folate  9.1 ng/mL  10/15/18  11:30    


 


Thyroxine (T4)  8.02 ug/dl (5.5-11.0)   10/12/18  11:33    


 


TSH 3rd Generation  2.54 mIU/ML (0.46-4.68)   10/12/18  11:33    


 


Urine Color  Colorless  (YELLOW)   10/12/18  05:27    


 


Urine Clarity  Clear  (Clear)   10/12/18  05:27    


 


Urine pH  6.0  (5.0-8.0)   10/12/18  05:27    


 


Ur Specific Gravity  1.005  (1.003-1.030)   10/12/18  05:27    


 


Urine Protein  Negative mg/dL (NEGATIVE)   10/12/18  05:27    


 


Urine Glucose (UA)  Neg mg/dL (Normal)   10/12/18  05:27    


 


Urine Ketones  Negative mg/dL (NEGATIVE)   10/12/18  05:27    


 


Urine Blood  Negative  (NEGATIVE)   10/12/18  05:27    


 


Urine Nitrate  Negative  (NEGATIVE)   10/12/18  05:27    


 


Urine Bilirubin  Negative  (NEGATIVE)   10/12/18  05:27    


 


Urine Urobilinogen  0.2-1.0 mg/dL (0.2-1.0)   10/12/18  05:27    


 


Ur Leukocyte Esterase  Neg Walter/uL (Negative)   10/12/18  05:27    


 


Urine RBC (Auto)  1 /hpf (0-3)   10/12/18  05:27    


 


HIV-1 Ab Rapid Screen  Non reactive  (NON REAC)   10/12/18  11:33    


 


Blood Type  O POSITIVE   10/21/18  13:00    


 


Blood Type Confirm  O POSITIVE   10/16/18  20:46    


 


Antibody Screen  Negative   10/21/18  13:00    


 


Crossmatch  See Detail   10/21/18  13:00    


 


BBK History Checked  Patient has bt   10/21/18  13:00    














Discharge Exam





- Head Exam


Head Exam: NORMAL INSPECTION





Discharge Plan





- Discharge Medications


Prescriptions: 


traMADol [Ultram] 50 mg PO Q6 PRN #10 tab


 PRN Reason: Pain, Moderate (4-7)





- Follow Up Plan


Condition: STABLE


Disposition: HOME/ ROUTINE


Instructions:  Colectomy


Additional Instructions: 


follow up with primary MD 1 week 


Referrals: 


Torrey Parisi MD [Staff Provider] - 


Ang Alatorre MD [Staff Provider] - 


Brady Jose MD [Staff Provider] - 


Heath Child MD [Staff Provider] -

## 2018-10-22 NOTE — CP.PCM.PN
Subjective





- Date & Time of Evaluation


Date of Evaluation: 10/22/18


Time of Evaluation: 09:34





- Subjective


Subjective: 





General Surgery





Pt seen and examined this AM.  Tolerating PO intake of regular diet.  (+) bm, 

(+) flatus.  Afebrile.  Pt c/o pain but has been able to ambulate with some 

discomfort.





Labs and vitals noted.  Hgb stable





PE


Gen: Pt sitting in bed eating breakfast in NAD


Cardio: s1s2 RRR


Lungs: CTA bilaterally


Abd: Soft (+) mild tenderness surrounding stapled incisions  (-) distention


Extr: (-) calf tenderness





A/P


POD 5 s/p laparoscopic hand assisted right hemicolectomy for colon 

adenocarcinoma


  Continue regular diet


  Pt cleared for discharge from surgical standpoint 


  Pt to follow up with Dr. Alatorre in the office in 10-14 days


   





Objective





- Vital Signs/Intake and Output


Vital Signs (last 24 hours): 


                                        











Temp Pulse Resp BP Pulse Ox


 


 98.4 F   68   20   131/77   94 L


 


 10/22/18 08:15  10/22/18 08:15  10/22/18 08:15  10/22/18 08:15  10/22/18 08:15











- Medications


Medications: 


                               Current Medications





Enoxaparin Sodium (Lovenox)  40 mg SC DAILY [facility]; Protocol


   Last Admin: 10/21/18 09:38 Dose:  40 mg


Epoetin Srinivasan (Procrit)  10,000 unit SC MWF [facility]


   Last Admin: 10/19/18 10:03 Dose:  10,000 unit


Iron Sucrose 100 mg/ Sodium (Chloride)  105 mls @ 105 mls/hr IVPB DAILY [facility]


   Last Admin: 10/21/18 09:41 Dose:  105 mls/hr


Sodium Chloride (Sodium Chloride 0.9%)  1,000 mls @ 1,000 mls/hr IV .Q1H PRN


   PRN Reason: Hypotension


Naloxone HCl (Narcan)  0.1 mg IVP Q2M PRN


   PRN Reason: Shortness of Breath


Ondansetron HCl (Zofran Inj)  4 mg IVP Q6 PRN


   PRN Reason: Nausea/Vomiting


   Last Admin: 10/18/18 18:29 Dose:  4 mg


Oxycodone HCl (Oxycodone Immediate Release Tab)  10 mg PO Q6 PRN


   PRN Reason: Pain, moderate (4-7)


Pantoprazole Sodium (Protonix Inj)  40 mg IVP DAILY [facility]


   Last Admin: 10/21/18 09:41 Dose:  40 mg











- Labs


Labs: 


                                        





                                 10/22/18 09:04 





                                 10/20/18 05:25 





                                        











PT  13.3 Seconds (9.8-13.1)  H  10/17/18  05:25    


 


INR  1.2   10/17/18  05:25    


 


APTT  32.9 Seconds (25.6-37.1)   10/17/18  05:25

## 2018-10-22 NOTE — CP.PCM.PN
Subjective





- Date & Time of Evaluation


Date of Evaluation: 10/22/18


Time of Evaluation: 07:35





- Subjective


Subjective: 





Surgery: Dr. Beasley





Pt seen and examined. No acute overnight events. States he feels well overall 

and has post-op pain that's controlled with the pain meds at the time. He was 

able to tolerate a regular diet and had a BM yesterday with flatus. Denies 

nausea/vomiting, fevers/chills. 





Objective





- Vital Signs/Intake and Output


Vital Signs (last 24 hours): 


                                        











Temp Pulse Resp BP Pulse Ox


 


 99.0 F   71   20   128/72   100 


 


 10/21/18 23:51  10/21/18 23:51  10/21/18 23:51  10/21/18 23:51  10/21/18 23:51











- Medications


Medications: 


                               Current Medications





Enoxaparin Sodium (Lovenox)  40 mg SC DAILY [facility]; Protocol


   Last Admin: 10/21/18 09:38 Dose:  40 mg


Epoetin Srinivasan (Procrit)  10,000 unit SC MWF [facility]


   Last Admin: 10/19/18 10:03 Dose:  10,000 unit


Iron Sucrose 100 mg/ Sodium (Chloride)  105 mls @ 105 mls/hr IVPB DAILY [facility]


   Last Admin: 10/21/18 09:41 Dose:  105 mls/hr


Sodium Chloride (Sodium Chloride 0.9%)  1,000 mls @ 1,000 mls/hr IV .Q1H PRN


   PRN Reason: Hypotension


Lactated Ringer's (Lactated Ringer's)  1,000 mls @ 75 mls/hr IV .[id number] [facility]


   Stop: 10/22/18 07:52


   Last Admin: 10/21/18 12:13 Dose:  Not Given


Morphine Sulfate (Morphine)  4 mg IVP Q6 PRN


   PRN Reason: Pain, severe (8-10)


   Last Admin: 10/21/18 21:28 Dose:  4 mg


Naloxone HCl (Narcan)  0.1 mg IVP Q2M PRN


   PRN Reason: Shortness of Breath


Ondansetron HCl (Zofran Inj)  4 mg IVP Q6 PRN


   PRN Reason: Nausea/Vomiting


   Last Admin: 10/18/18 18:29 Dose:  4 mg


Oxycodone/Acetaminophen (Percocet 5/325 Mg Tab)  1 tab PO Q4 PRN


   PRN Reason: Pain, moderate (4-7)


   Stop: 10/23/18 07:50


   Last Admin: 10/22/18 00:48 Dose:  1 tab


Pantoprazole Sodium (Protonix Inj)  40 mg IVP DAILY [facility]


   Last Admin: 10/21/18 09:41 Dose:  40 mg











- Labs


Labs: 


                                        





                                 10/21/18 12:20 





                                 10/20/18 05:25 





                                        











PT  13.3 Seconds (9.8-13.1)  H  10/17/18  05:25    


 


INR  1.2   10/17/18  05:25    


 


APTT  32.9 Seconds (25.6-37.1)   10/17/18  05:25    














- Constitutional


Appears: Well, No Acute Distress





- Head Exam


Head Exam: ATRAUMATIC, NORMOCEPHALIC





- Eye Exam


Eye Exam: Normal appearance





- ENT Exam


ENT Exam: Mucous Membranes Moist





- Respiratory Exam


Respiratory Exam: NORMAL BREATHING PATTERN





- Cardiovascular Exam


Cardiovascular Exam: RRR





- GI/Abdominal Exam


GI & Abdominal Exam: Soft.  absent: Distended, Guarding, Tenderness, Rebound





- Neurological Exam


Neurological Exam: Alert, Awake, Oriented x3





- Skin


Skin Exam: Dry, Warm





Assessment and Plan





- Assessment and Plan (Free Text)


Assessment: 





42M s/p lap hand assist R hemicolectomy for colon adenocarcinoma; Stage IIA 


Plan: 





- pt having BMs and tolerating regular diet; can be DC from surgical standpoint 

later today


- f/u in the office in 2 weeks with Dr. Alatorre 


- no heavy lifting > 15lbs for 4-6 weeks


- d/w Dr. Ramos Zee

## 2022-10-15 NOTE — CP.PCM.PN
Recommendation to be emailed to case management office Subjective





- Date & Time of Evaluation


Date of Evaluation: 10/15/18


Time of Evaluation: 10:27





- Subjective


Subjective: 


General Surgery 





Pt seen and examined this AM.  He denies having abdominal pain. (+) flatus, (-) 

BM. He wants to eat solids, but understands why he can only have liquids at this

time.  Pathology pending.  





Labs and vitals noted. 





PE


Gen: Pt laying in bed in NAD


Skin: warm and dry


Cardio: s1s2 RRR


Lungs: CTA bilaterally


Abd: Soft, (+) mild RUQ tenderness, (+) well healed surgical scar at the RLQ c/w

with open appendectomy





A/P


Colon Mass


  Pending pathology


  Pt will need surgery after pathology resulted


  Continue full liquids 





Objective





- Vital Signs/Intake and Output


Vital Signs (last 24 hours): 


                                        











Temp Pulse Resp BP Pulse Ox


 


 98.0 F   57 L  19   132/77   99 


 


 10/15/18 09:16  10/15/18 09:16  10/15/18 09:16  10/15/18 09:16  10/15/18 09:16











- Medications


Medications: 


                               Current Medications





Morphine Sulfate (Morphine)  4 mg IVP Q4 PRN


   PRN Reason: Pain, severe (8-10)


   Last Admin: 10/13/18 22:26 Dose:  4 mg


Ondansetron HCl (Zofran Inj)  4 mg IVP Q6 PRN


   PRN Reason: Nausea/Vomiting


   Last Admin: 10/12/18 17:38 Dose:  4 mg











- Labs


Labs: 


                                        





                                 10/12/18 11:33 





                                 10/12/18 11:33 





                                        











PT  11.9 Seconds (9.8-13.1)   10/11/18  18:30    


 


INR  1.1   10/11/18  18:30    


 


APTT  29.7 Seconds (25.6-37.1)   10/11/18  18:30 NATHAN Kelley